# Patient Record
Sex: MALE | Race: WHITE | Employment: FULL TIME | URBAN - METROPOLITAN AREA
[De-identification: names, ages, dates, MRNs, and addresses within clinical notes are randomized per-mention and may not be internally consistent; named-entity substitution may affect disease eponyms.]

---

## 2021-04-14 ENCOUNTER — IMMUNIZATION (OUTPATIENT)
Dept: PRIMARY CARE CLINIC | Age: 66
End: 2021-04-14
Payer: COMMERCIAL

## 2021-04-14 PROCEDURE — 91300 COVID-19, PFIZER VACCINE 30MCG/0.3ML DOSE: CPT | Performed by: INTERNAL MEDICINE

## 2021-04-14 PROCEDURE — 0001A COVID-19, PFIZER VACCINE 30MCG/0.3ML DOSE: CPT | Performed by: INTERNAL MEDICINE

## 2021-05-10 ENCOUNTER — IMMUNIZATION (OUTPATIENT)
Dept: PRIMARY CARE CLINIC | Age: 66
End: 2021-05-10
Payer: COMMERCIAL

## 2021-05-10 PROCEDURE — 0002A COVID-19, PFIZER VACCINE 30MCG/0.3ML DOSE: CPT | Performed by: INTERNAL MEDICINE

## 2021-05-10 PROCEDURE — 91300 COVID-19, PFIZER VACCINE 30MCG/0.3ML DOSE: CPT | Performed by: INTERNAL MEDICINE

## 2022-02-21 PROBLEM — Z85.810 H/O TONGUE CANCER: Status: ACTIVE | Noted: 2021-04-14

## 2022-02-21 PROBLEM — E66.01 MORBID OBESITY (HCC): Status: ACTIVE | Noted: 2022-02-21

## 2022-02-21 PROBLEM — M10.9 GOUT: Status: ACTIVE | Noted: 2022-02-21

## 2022-02-21 PROBLEM — E78.5 HYPERLIPIDEMIA: Status: ACTIVE | Noted: 2022-02-21

## 2022-02-21 PROBLEM — I87.8 VENOUS STASIS: Status: ACTIVE | Noted: 2022-02-21

## 2022-02-21 PROBLEM — R60.9 PERIPHERAL EDEMA: Status: ACTIVE | Noted: 2022-02-21

## 2022-02-21 PROBLEM — G47.33 OBSTRUCTIVE SLEEP APNEA SYNDROME: Status: ACTIVE | Noted: 2022-02-21

## 2022-02-21 PROBLEM — I10 BENIGN ESSENTIAL HYPERTENSION: Status: ACTIVE | Noted: 2021-03-25

## 2022-02-21 PROBLEM — F41.1 GENERALIZED ANXIETY DISORDER: Status: ACTIVE | Noted: 2022-02-21

## 2022-02-21 PROBLEM — N52.9 ERECTILE DYSFUNCTION: Status: ACTIVE | Noted: 2022-02-21

## 2022-02-21 PROBLEM — J30.2 SEASONAL ALLERGIES: Status: ACTIVE | Noted: 2022-02-21

## 2022-02-21 PROBLEM — F32.1 MODERATE MAJOR DEPRESSION, SINGLE EPISODE (HCC): Status: ACTIVE | Noted: 2022-02-21

## 2022-02-21 PROBLEM — M1A.0710 CHRONIC IDIOPATHIC GOUT INVOLVING TOE OF RIGHT FOOT WITHOUT TOPHUS: Status: ACTIVE | Noted: 2022-02-21

## 2022-02-21 PROBLEM — E78.2 MIXED HYPERLIPIDEMIA: Status: ACTIVE | Noted: 2022-02-21

## 2025-02-26 ENCOUNTER — HOSPITAL ENCOUNTER (OUTPATIENT)
Dept: RADIOLOGY | Facility: HOSPITAL | Age: 70
Discharge: HOME | End: 2025-02-26
Payer: MEDICARE

## 2025-02-26 DIAGNOSIS — Z13.6 ENCOUNTER FOR SCREENING FOR CARDIOVASCULAR DISORDERS: ICD-10-CM

## 2025-02-26 PROCEDURE — 75571 CT HRT W/O DYE W/CA TEST: CPT

## 2025-03-25 ENCOUNTER — APPOINTMENT (OUTPATIENT)
Dept: RADIOLOGY | Facility: CLINIC | Age: 70
End: 2025-03-25
Payer: MEDICARE

## 2025-07-17 ENCOUNTER — HOSPITAL ENCOUNTER (INPATIENT)
Facility: HOSPITAL | Age: 70
LOS: 2 days | Discharge: HOME | End: 2025-07-19
Attending: STUDENT IN AN ORGANIZED HEALTH CARE EDUCATION/TRAINING PROGRAM | Admitting: STUDENT IN AN ORGANIZED HEALTH CARE EDUCATION/TRAINING PROGRAM
Payer: MEDICARE

## 2025-07-17 ENCOUNTER — ANESTHESIA EVENT (OUTPATIENT)
Dept: CARDIOLOGY | Facility: HOSPITAL | Age: 70
End: 2025-07-17
Payer: MEDICARE

## 2025-07-17 ENCOUNTER — APPOINTMENT (OUTPATIENT)
Dept: CARDIOLOGY | Facility: HOSPITAL | Age: 70
End: 2025-07-17
Payer: MEDICARE

## 2025-07-17 ENCOUNTER — ANESTHESIA (OUTPATIENT)
Dept: CARDIOLOGY | Facility: HOSPITAL | Age: 70
End: 2025-07-17
Payer: MEDICARE

## 2025-07-17 DIAGNOSIS — I47.20 VT (VENTRICULAR TACHYCARDIA) (MULTI): Primary | ICD-10-CM

## 2025-07-17 DIAGNOSIS — M10.10 LEAD-INDUCED ACUTE GOUT, UNSPECIFIED SITE, INITIAL ENCOUNTER: ICD-10-CM

## 2025-07-17 DIAGNOSIS — T56.0X1A LEAD-INDUCED ACUTE GOUT, UNSPECIFIED SITE, INITIAL ENCOUNTER: ICD-10-CM

## 2025-07-17 PROBLEM — M10.9 GOUT: Status: ACTIVE | Noted: 2025-07-17

## 2025-07-17 PROBLEM — E78.5 HYPERLIPIDEMIA: Status: ACTIVE | Noted: 2025-07-17

## 2025-07-17 PROBLEM — I25.10 CORONARY ARTERY DISEASE INVOLVING NATIVE CORONARY ARTERY WITHOUT ANGINA PECTORIS: Status: ACTIVE | Noted: 2025-07-17

## 2025-07-17 PROBLEM — R93.1 DECREASED CARDIAC EJECTION FRACTION: Status: ACTIVE | Noted: 2025-07-17

## 2025-07-17 PROBLEM — I63.9 CVA (CEREBRAL VASCULAR ACCIDENT) (MULTI): Status: ACTIVE | Noted: 2025-07-17

## 2025-07-17 PROBLEM — G45.9 TIA (TRANSIENT ISCHEMIC ATTACK): Status: ACTIVE | Noted: 2025-07-17

## 2025-07-17 LAB
ABO GROUP (TYPE) IN BLOOD: NORMAL
ACT BLD: 302 SEC (ref 82–174)
ACT BLD: 315 SEC (ref 82–174)
ALBUMIN SERPL BCP-MCNC: 3.8 G/DL (ref 3.4–5)
ALP SERPL-CCNC: 76 U/L (ref 33–136)
ALT SERPL W P-5'-P-CCNC: 12 U/L (ref 10–52)
ANION GAP SERPL CALC-SCNC: 14 MMOL/L (ref 10–20)
ANTIBODY SCREEN: NORMAL
APTT PPP: 31 SECONDS (ref 26–36)
AST SERPL W P-5'-P-CCNC: 16 U/L (ref 9–39)
ATRIAL RATE: 64 BPM
BASOPHILS # BLD AUTO: 0.03 X10*3/UL (ref 0–0.1)
BASOPHILS NFR BLD AUTO: 0.4 %
BILIRUB SERPL-MCNC: 0.8 MG/DL (ref 0–1.2)
BUN SERPL-MCNC: 12 MG/DL (ref 6–23)
CALCIUM SERPL-MCNC: 9.3 MG/DL (ref 8.6–10.6)
CHLORIDE SERPL-SCNC: 103 MMOL/L (ref 98–107)
CO2 SERPL-SCNC: 24 MMOL/L (ref 21–32)
CREAT SERPL-MCNC: 0.97 MG/DL (ref 0.5–1.3)
EGFRCR SERPLBLD CKD-EPI 2021: 84 ML/MIN/1.73M*2
EOSINOPHIL # BLD AUTO: 0.12 X10*3/UL (ref 0–0.7)
EOSINOPHIL NFR BLD AUTO: 1.5 %
ERYTHROCYTE [DISTWIDTH] IN BLOOD BY AUTOMATED COUNT: 13.7 % (ref 11.5–14.5)
GLUCOSE SERPL-MCNC: 89 MG/DL (ref 74–99)
HCT VFR BLD AUTO: 48 % (ref 41–52)
HGB BLD-MCNC: 15.1 G/DL (ref 13.5–17.5)
HOLD SPECIMEN: NORMAL
IMM GRANULOCYTES # BLD AUTO: 0.03 X10*3/UL (ref 0–0.7)
IMM GRANULOCYTES NFR BLD AUTO: 0.4 % (ref 0–0.9)
INR PPP: 1.2 (ref 0.9–1.1)
LYMPHOCYTES # BLD AUTO: 1.01 X10*3/UL (ref 1.2–4.8)
LYMPHOCYTES NFR BLD AUTO: 12.4 %
MAGNESIUM SERPL-MCNC: 2.21 MG/DL (ref 1.6–2.4)
MCH RBC QN AUTO: 28 PG (ref 26–34)
MCHC RBC AUTO-ENTMCNC: 31.5 G/DL (ref 32–36)
MCV RBC AUTO: 89 FL (ref 80–100)
MONOCYTES # BLD AUTO: 0.81 X10*3/UL (ref 0.1–1)
MONOCYTES NFR BLD AUTO: 9.9 %
NEUTROPHILS # BLD AUTO: 6.17 X10*3/UL (ref 1.2–7.7)
NEUTROPHILS NFR BLD AUTO: 75.4 %
NRBC BLD-RTO: 0 /100 WBCS (ref 0–0)
P AXIS: 82 DEGREES
P OFFSET: 203 MS
P ONSET: 145 MS
PLATELET # BLD AUTO: 158 X10*3/UL (ref 150–450)
POTASSIUM SERPL-SCNC: 4 MMOL/L (ref 3.5–5.3)
PR INTERVAL: 154 MS
PROT SERPL-MCNC: 6.4 G/DL (ref 6.4–8.2)
PROTHROMBIN TIME: 13 SECONDS (ref 9.8–12.4)
Q ONSET: 222 MS
QRS COUNT: 11 BEATS
QRS DURATION: 104 MS
QT INTERVAL: 414 MS
QTC CALCULATION(BAZETT): 427 MS
QTC FREDERICIA: 422 MS
R AXIS: -36 DEGREES
RBC # BLD AUTO: 5.39 X10*6/UL (ref 4.5–5.9)
RH FACTOR (ANTIGEN D): NORMAL
SODIUM SERPL-SCNC: 137 MMOL/L (ref 136–145)
T AXIS: 85 DEGREES
T OFFSET: 429 MS
T4 FREE SERPL-MCNC: 0.82 NG/DL (ref 0.78–1.48)
TSH SERPL-ACNC: 3.63 MIU/L (ref 0.44–3.98)
VENTRICULAR RATE: 64 BPM
WBC # BLD AUTO: 8.2 X10*3/UL (ref 4.4–11.3)

## 2025-07-17 PROCEDURE — 85347 COAGULATION TIME ACTIVATED: CPT

## 2025-07-17 PROCEDURE — 84443 ASSAY THYROID STIM HORMONE: CPT

## 2025-07-17 PROCEDURE — 85347 COAGULATION TIME ACTIVATED: CPT | Performed by: INTERNAL MEDICINE

## 2025-07-17 PROCEDURE — 93654 COMPRE EP EVAL TX VT: CPT | Performed by: INTERNAL MEDICINE

## 2025-07-17 PROCEDURE — 93662 INTRACARDIAC ECG (ICE): CPT | Performed by: INTERNAL MEDICINE

## 2025-07-17 PROCEDURE — 2780000003 HC OR 278 NO HCPCS: Performed by: INTERNAL MEDICINE

## 2025-07-17 PROCEDURE — 3700000002 HC GENERAL ANESTHESIA TIME - EACH INCREMENTAL 1 MINUTE: Performed by: INTERNAL MEDICINE

## 2025-07-17 PROCEDURE — 2020000001 HC ICU ROOM DAILY

## 2025-07-17 PROCEDURE — 85025 COMPLETE CBC W/AUTO DIFF WBC: CPT | Performed by: STUDENT IN AN ORGANIZED HEALTH CARE EDUCATION/TRAINING PROGRAM

## 2025-07-17 PROCEDURE — C1732 CATH, EP, DIAG/ABL, 3D/VECT: HCPCS | Performed by: INTERNAL MEDICINE

## 2025-07-17 PROCEDURE — G0269 OCCLUSIVE DEVICE IN VEIN ART: HCPCS | Performed by: INTERNAL MEDICINE

## 2025-07-17 PROCEDURE — 36415 COLL VENOUS BLD VENIPUNCTURE: CPT

## 2025-07-17 PROCEDURE — 2500000001 HC RX 250 WO HCPCS SELF ADMINISTERED DRUGS (ALT 637 FOR MEDICARE OP)

## 2025-07-17 PROCEDURE — C1759 CATH, INTRA ECHOCARDIOGRAPHY: HCPCS | Performed by: INTERNAL MEDICINE

## 2025-07-17 PROCEDURE — C1894 INTRO/SHEATH, NON-LASER: HCPCS | Performed by: INTERNAL MEDICINE

## 2025-07-17 PROCEDURE — 2500000004 HC RX 250 GENERAL PHARMACY W/ HCPCS (ALT 636 FOR OP/ED): Performed by: STUDENT IN AN ORGANIZED HEALTH CARE EDUCATION/TRAINING PROGRAM

## 2025-07-17 PROCEDURE — 99291 CRITICAL CARE FIRST HOUR: CPT

## 2025-07-17 PROCEDURE — 86850 RBC ANTIBODY SCREEN: CPT

## 2025-07-17 PROCEDURE — C1769 GUIDE WIRE: HCPCS | Performed by: INTERNAL MEDICINE

## 2025-07-17 PROCEDURE — C1887 CATHETER, GUIDING: HCPCS | Performed by: INTERNAL MEDICINE

## 2025-07-17 PROCEDURE — 02583ZZ DESTRUCTION OF CONDUCTION MECHANISM, PERCUTANEOUS APPROACH: ICD-10-PCS | Performed by: INTERNAL MEDICINE

## 2025-07-17 PROCEDURE — 84439 ASSAY OF FREE THYROXINE: CPT

## 2025-07-17 PROCEDURE — 80053 COMPREHEN METABOLIC PANEL: CPT | Performed by: STUDENT IN AN ORGANIZED HEALTH CARE EDUCATION/TRAINING PROGRAM

## 2025-07-17 PROCEDURE — 02K83ZZ MAP CONDUCTION MECHANISM, PERCUTANEOUS APPROACH: ICD-10-PCS | Performed by: INTERNAL MEDICINE

## 2025-07-17 PROCEDURE — 2500000004 HC RX 250 GENERAL PHARMACY W/ HCPCS (ALT 636 FOR OP/ED): Mod: JW | Performed by: ANESTHESIOLOGIST ASSISTANT

## 2025-07-17 PROCEDURE — 76937 US GUIDE VASCULAR ACCESS: CPT | Performed by: INTERNAL MEDICINE

## 2025-07-17 PROCEDURE — C1760 CLOSURE DEV, VASC: HCPCS | Performed by: INTERNAL MEDICINE

## 2025-07-17 PROCEDURE — 83735 ASSAY OF MAGNESIUM: CPT | Performed by: STUDENT IN AN ORGANIZED HEALTH CARE EDUCATION/TRAINING PROGRAM

## 2025-07-17 PROCEDURE — C1730 CATH, EP, 19 OR FEW ELECT: HCPCS | Performed by: INTERNAL MEDICINE

## 2025-07-17 PROCEDURE — C1766 INTRO/SHEATH,STRBLE,NON-PEEL: HCPCS | Performed by: INTERNAL MEDICINE

## 2025-07-17 PROCEDURE — 2500000004 HC RX 250 GENERAL PHARMACY W/ HCPCS (ALT 636 FOR OP/ED): Performed by: INTERNAL MEDICINE

## 2025-07-17 PROCEDURE — 85730 THROMBOPLASTIN TIME PARTIAL: CPT

## 2025-07-17 PROCEDURE — 93005 ELECTROCARDIOGRAM TRACING: CPT

## 2025-07-17 PROCEDURE — 3700000001 HC GENERAL ANESTHESIA TIME - INITIAL BASE CHARGE: Performed by: INTERNAL MEDICINE

## 2025-07-17 PROCEDURE — 93010 ELECTROCARDIOGRAM REPORT: CPT | Performed by: INTERNAL MEDICINE

## 2025-07-17 PROCEDURE — 2720000007 HC OR 272 NO HCPCS: Performed by: INTERNAL MEDICINE

## 2025-07-17 RX ORDER — COLCHICINE 0.6 MG/1
1.2 CAPSULE ORAL DAILY
Status: DISCONTINUED | OUTPATIENT
Start: 2025-07-17 | End: 2025-07-17

## 2025-07-17 RX ORDER — FENTANYL CITRATE 50 UG/ML
INJECTION, SOLUTION INTRAMUSCULAR; INTRAVENOUS AS NEEDED
Status: DISCONTINUED | OUTPATIENT
Start: 2025-07-17 | End: 2025-07-17

## 2025-07-17 RX ORDER — MEXILETINE HYDROCHLORIDE 200 MG/1
200 CAPSULE ORAL EVERY 12 HOURS SCHEDULED
Status: DISCONTINUED | OUTPATIENT
Start: 2025-07-17 | End: 2025-07-19 | Stop reason: HOSPADM

## 2025-07-17 RX ORDER — PROTAMINE SULFATE 10 MG/ML
INJECTION, SOLUTION INTRAVENOUS AS NEEDED
Status: DISCONTINUED | OUTPATIENT
Start: 2025-07-17 | End: 2025-07-17

## 2025-07-17 RX ORDER — NAPROXEN SODIUM 220 MG/1
81 TABLET, FILM COATED ORAL
COMMUNITY
Start: 2025-05-27 | End: 2026-05-27

## 2025-07-17 RX ORDER — HEPARIN SODIUM 1000 [USP'U]/ML
INJECTION, SOLUTION INTRAVENOUS; SUBCUTANEOUS AS NEEDED
Status: DISCONTINUED | OUTPATIENT
Start: 2025-07-17 | End: 2025-07-17 | Stop reason: HOSPADM

## 2025-07-17 RX ORDER — PHENYLEPHRINE 10 MG/250 ML(40 MCG/ML)IN 0.9 % SOD.CHLORIDE INTRAVENOUS
CONTINUOUS PRN
Status: DISCONTINUED | OUTPATIENT
Start: 2025-07-17 | End: 2025-07-17

## 2025-07-17 RX ORDER — PROPOFOL 10 MG/ML
INJECTION, EMULSION INTRAVENOUS CONTINUOUS PRN
Status: DISCONTINUED | OUTPATIENT
Start: 2025-07-17 | End: 2025-07-17

## 2025-07-17 RX ORDER — METOPROLOL TARTRATE 25 MG/1
25 TABLET, FILM COATED ORAL 2 TIMES DAILY
COMMUNITY
Start: 2025-07-11 | End: 2026-07-11

## 2025-07-17 RX ORDER — SODIUM CHLORIDE, SODIUM LACTATE, POTASSIUM CHLORIDE, CALCIUM CHLORIDE 600; 310; 30; 20 MG/100ML; MG/100ML; MG/100ML; MG/100ML
INJECTION, SOLUTION INTRAVENOUS CONTINUOUS PRN
Status: DISCONTINUED | OUTPATIENT
Start: 2025-07-17 | End: 2025-07-17

## 2025-07-17 RX ORDER — BUPIVACAINE HYDROCHLORIDE 5 MG/ML
INJECTION, SOLUTION EPIDURAL; INTRACAUDAL; PERINEURAL AS NEEDED
Status: DISCONTINUED | OUTPATIENT
Start: 2025-07-17 | End: 2025-07-17 | Stop reason: HOSPADM

## 2025-07-17 RX ORDER — LIDOCAINE HYDROCHLORIDE AND EPINEPHRINE BITARTRATE 20; .01 MG/ML; MG/ML
1.7 INJECTION, SOLUTION SUBCUTANEOUS ONCE
Status: DISCONTINUED | OUTPATIENT
Start: 2025-07-17 | End: 2025-07-17

## 2025-07-17 RX ORDER — ACETAMINOPHEN 500 MG
5 TABLET ORAL NIGHTLY PRN
COMMUNITY

## 2025-07-17 RX ORDER — MIDAZOLAM HYDROCHLORIDE 5 MG/ML
INJECTION INTRAMUSCULAR; INTRAVENOUS AS NEEDED
Status: DISCONTINUED | OUTPATIENT
Start: 2025-07-17 | End: 2025-07-17

## 2025-07-17 RX ORDER — DEXMEDETOMIDINE HYDROCHLORIDE 4 UG/ML
INJECTION, SOLUTION INTRAVENOUS CONTINUOUS PRN
Status: DISCONTINUED | OUTPATIENT
Start: 2025-07-17 | End: 2025-07-17

## 2025-07-17 RX ORDER — LIDOCAINE HYDROCHLORIDE AND EPINEPHRINE 10; 20 UG/ML; MG/ML
10 INJECTION, SOLUTION INFILTRATION; PERINEURAL ONCE
Status: DISCONTINUED | OUTPATIENT
Start: 2025-07-17 | End: 2025-07-19 | Stop reason: HOSPADM

## 2025-07-17 RX ORDER — NAPROXEN SODIUM 220 MG/1
81 TABLET, FILM COATED ORAL DAILY
Status: DISCONTINUED | OUTPATIENT
Start: 2025-07-17 | End: 2025-07-19 | Stop reason: HOSPADM

## 2025-07-17 RX ORDER — LIDOCAINE HYDROCHLORIDE ANHYDROUS AND DEXTROSE MONOHYDRATE .8; 5 G/100ML; G/100ML
INJECTION, SOLUTION INTRAVENOUS
Status: DISPENSED
Start: 2025-07-17 | End: 2025-07-17

## 2025-07-17 RX ORDER — METOPROLOL TARTRATE 25 MG/1
25 TABLET, FILM COATED ORAL 2 TIMES DAILY
Status: DISCONTINUED | OUTPATIENT
Start: 2025-07-17 | End: 2025-07-19 | Stop reason: HOSPADM

## 2025-07-17 RX ORDER — ATORVASTATIN CALCIUM 40 MG/1
40 TABLET, FILM COATED ORAL DAILY
COMMUNITY
Start: 2025-05-28

## 2025-07-17 RX ORDER — SODIUM CHLORIDE 9 MG/ML
INJECTION, SOLUTION INTRAVENOUS CONTINUOUS PRN
Status: COMPLETED | OUTPATIENT
Start: 2025-07-17 | End: 2025-07-17

## 2025-07-17 RX ORDER — ONDANSETRON HYDROCHLORIDE 2 MG/ML
INJECTION, SOLUTION INTRAVENOUS AS NEEDED
Status: DISCONTINUED | OUTPATIENT
Start: 2025-07-17 | End: 2025-07-17

## 2025-07-17 RX ORDER — POLYETHYLENE GLYCOL 3350 17 G/17G
17 POWDER, FOR SOLUTION ORAL DAILY
Status: DISCONTINUED | OUTPATIENT
Start: 2025-07-17 | End: 2025-07-19 | Stop reason: HOSPADM

## 2025-07-17 RX ORDER — COLCHICINE 0.6 MG/1
1.2 TABLET ORAL DAILY
Status: DISCONTINUED | OUTPATIENT
Start: 2025-07-17 | End: 2025-07-19 | Stop reason: HOSPADM

## 2025-07-17 RX ORDER — ATORVASTATIN CALCIUM 40 MG/1
40 TABLET, FILM COATED ORAL NIGHTLY
Status: DISCONTINUED | OUTPATIENT
Start: 2025-07-17 | End: 2025-07-19 | Stop reason: HOSPADM

## 2025-07-17 RX ORDER — AMMONIUM LACTATE 12 G/100G
1 LOTION TOPICAL 2 TIMES DAILY PRN
COMMUNITY
Start: 2024-11-14

## 2025-07-17 RX ADMIN — PROTAMINE SULFATE 40 MG: 10 INJECTION, SOLUTION INTRAVENOUS at 16:30

## 2025-07-17 RX ADMIN — APIXABAN 5 MG: 5 TABLET, FILM COATED ORAL at 20:34

## 2025-07-17 RX ADMIN — ATORVASTATIN CALCIUM 40 MG: 40 TABLET, FILM COATED ORAL at 20:34

## 2025-07-17 RX ADMIN — DEXMEDETOMIDINE HYDROCHLORIDE 0.4 MCG/KG/HR: 4 INJECTION, SOLUTION INTRAVENOUS at 13:54

## 2025-07-17 RX ADMIN — SODIUM CHLORIDE, POTASSIUM CHLORIDE, SODIUM LACTATE AND CALCIUM CHLORIDE: 600; 310; 30; 20 INJECTION, SOLUTION INTRAVENOUS at 13:54

## 2025-07-17 RX ADMIN — DEXAMETHASONE SODIUM PHOSPHATE 4 MG: 4 INJECTION INTRA-ARTICULAR; INTRALESIONAL; INTRAMUSCULAR; INTRAVENOUS; SOFT TISSUE at 13:54

## 2025-07-17 RX ADMIN — SODIUM CHLORIDE, SODIUM LACTATE, POTASSIUM CHLORIDE, AND CALCIUM CHLORIDE: 600; 310; 30; 20 INJECTION, SOLUTION INTRAVENOUS at 13:35

## 2025-07-17 RX ADMIN — METOPROLOL TARTRATE 25 MG: 25 TABLET, FILM COATED ORAL at 09:00

## 2025-07-17 RX ADMIN — FENTANYL CITRATE 25 MCG: 50 INJECTION, SOLUTION INTRAMUSCULAR; INTRAVENOUS at 15:12

## 2025-07-17 RX ADMIN — POLYETHYLENE GLYCOL 3350 17 G: 17 POWDER, FOR SOLUTION ORAL at 09:01

## 2025-07-17 RX ADMIN — ONDANSETRON 4 MG: 2 INJECTION, SOLUTION INTRAMUSCULAR; INTRAVENOUS at 13:54

## 2025-07-17 RX ADMIN — FENTANYL CITRATE 25 MCG: 50 INJECTION, SOLUTION INTRAMUSCULAR; INTRAVENOUS at 15:07

## 2025-07-17 RX ADMIN — FENTANYL CITRATE 50 MCG: 50 INJECTION, SOLUTION INTRAMUSCULAR; INTRAVENOUS at 13:54

## 2025-07-17 RX ADMIN — FENTANYL CITRATE 25 MCG: 50 INJECTION, SOLUTION INTRAMUSCULAR; INTRAVENOUS at 16:12

## 2025-07-17 RX ADMIN — MEXILETINE HYDROCHLORIDE 200 MG: 200 CAPSULE ORAL at 20:34

## 2025-07-17 RX ADMIN — PHENYLEPHRINE-NACL IV SOLUTION 10 MG/250ML-0.9% 0.2 MCG/KG/MIN: 10-0.9/25 SOLUTION at 15:06

## 2025-07-17 RX ADMIN — ASPIRIN 81 MG: 81 TABLET, CHEWABLE ORAL at 09:00

## 2025-07-17 RX ADMIN — PROPOFOL 5 MCG/KG/MIN: 10 INJECTION, EMULSION INTRAVENOUS at 13:54

## 2025-07-17 RX ADMIN — PROPOFOL 10 MG: 10 INJECTION, EMULSION INTRAVENOUS at 15:11

## 2025-07-17 RX ADMIN — COLCHICINE 1.2 MG: 0.6 TABLET, FILM COATED ORAL at 09:00

## 2025-07-17 RX ADMIN — MIDAZOLAM 2 MG: 5 INJECTION INTRAMUSCULAR; INTRAVENOUS at 13:35

## 2025-07-17 SDOH — ECONOMIC STABILITY: FOOD INSECURITY: WITHIN THE PAST 12 MONTHS, YOU WORRIED THAT YOUR FOOD WOULD RUN OUT BEFORE YOU GOT THE MONEY TO BUY MORE.: NEVER TRUE

## 2025-07-17 SDOH — ECONOMIC STABILITY: HOUSING INSECURITY: IN THE LAST 12 MONTHS, WAS THERE A TIME WHEN YOU WERE NOT ABLE TO PAY THE MORTGAGE OR RENT ON TIME?: NO

## 2025-07-17 SDOH — SOCIAL STABILITY: SOCIAL INSECURITY: DOES ANYONE TRY TO KEEP YOU FROM HAVING/CONTACTING OTHER FRIENDS OR DOING THINGS OUTSIDE YOUR HOME?: NO

## 2025-07-17 SDOH — HEALTH STABILITY: MENTAL HEALTH: HOW OFTEN DO YOU HAVE A DRINK CONTAINING ALCOHOL?: NEVER

## 2025-07-17 SDOH — ECONOMIC STABILITY: INCOME INSECURITY: IN THE PAST 12 MONTHS HAS THE ELECTRIC, GAS, OIL, OR WATER COMPANY THREATENED TO SHUT OFF SERVICES IN YOUR HOME?: NO

## 2025-07-17 SDOH — ECONOMIC STABILITY: FOOD INSECURITY: WITHIN THE PAST 12 MONTHS, THE FOOD YOU BOUGHT JUST DIDN'T LAST AND YOU DIDN'T HAVE MONEY TO GET MORE.: NEVER TRUE

## 2025-07-17 SDOH — SOCIAL STABILITY: SOCIAL INSECURITY: ARE YOU MARRIED, WIDOWED, DIVORCED, SEPARATED, NEVER MARRIED, OR LIVING WITH A PARTNER?: MARRIED

## 2025-07-17 SDOH — SOCIAL STABILITY: SOCIAL INSECURITY: WITHIN THE LAST YEAR, HAVE YOU BEEN HUMILIATED OR EMOTIONALLY ABUSED IN OTHER WAYS BY YOUR PARTNER OR EX-PARTNER?: NO

## 2025-07-17 SDOH — SOCIAL STABILITY: SOCIAL INSECURITY: ARE YOU OR HAVE YOU BEEN THREATENED OR ABUSED PHYSICALLY, EMOTIONALLY, OR SEXUALLY BY ANYONE?: NO

## 2025-07-17 SDOH — SOCIAL STABILITY: SOCIAL INSECURITY
WITHIN THE LAST YEAR, HAVE YOU BEEN RAPED OR FORCED TO HAVE ANY KIND OF SEXUAL ACTIVITY BY YOUR PARTNER OR EX-PARTNER?: NO

## 2025-07-17 SDOH — ECONOMIC STABILITY: HOUSING INSECURITY: AT ANY TIME IN THE PAST 12 MONTHS, WERE YOU HOMELESS OR LIVING IN A SHELTER (INCLUDING NOW)?: NO

## 2025-07-17 SDOH — ECONOMIC STABILITY: HOUSING INSECURITY: IN THE PAST 12 MONTHS, HOW MANY TIMES HAVE YOU MOVED WHERE YOU WERE LIVING?: 0

## 2025-07-17 SDOH — SOCIAL STABILITY: SOCIAL INSECURITY: WITHIN THE LAST YEAR, HAVE YOU BEEN AFRAID OF YOUR PARTNER OR EX-PARTNER?: NO

## 2025-07-17 SDOH — SOCIAL STABILITY: SOCIAL INSECURITY: ABUSE: ADULT

## 2025-07-17 SDOH — HEALTH STABILITY: MENTAL HEALTH: HOW MANY DRINKS CONTAINING ALCOHOL DO YOU HAVE ON A TYPICAL DAY WHEN YOU ARE DRINKING?: PATIENT DOES NOT DRINK

## 2025-07-17 SDOH — ECONOMIC STABILITY: TRANSPORTATION INSECURITY: IN THE PAST 12 MONTHS, HAS LACK OF TRANSPORTATION KEPT YOU FROM MEDICAL APPOINTMENTS OR FROM GETTING MEDICATIONS?: NO

## 2025-07-17 SDOH — HEALTH STABILITY: MENTAL HEALTH: HOW OFTEN DO YOU HAVE SIX OR MORE DRINKS ON ONE OCCASION?: NEVER

## 2025-07-17 SDOH — SOCIAL STABILITY: SOCIAL INSECURITY
WITHIN THE LAST YEAR, HAVE YOU BEEN KICKED, HIT, SLAPPED, OR OTHERWISE PHYSICALLY HURT BY YOUR PARTNER OR EX-PARTNER?: NO

## 2025-07-17 SDOH — SOCIAL STABILITY: SOCIAL INSECURITY: ARE THERE ANY APPARENT SIGNS OF INJURIES/BEHAVIORS THAT COULD BE RELATED TO ABUSE/NEGLECT?: NO

## 2025-07-17 SDOH — HEALTH STABILITY: PHYSICAL HEALTH: ON AVERAGE, HOW MANY MINUTES DO YOU ENGAGE IN EXERCISE AT THIS LEVEL?: 40 MIN

## 2025-07-17 SDOH — SOCIAL STABILITY: SOCIAL INSECURITY: WERE YOU ABLE TO COMPLETE ALL THE BEHAVIORAL HEALTH SCREENINGS?: YES

## 2025-07-17 SDOH — HEALTH STABILITY: PHYSICAL HEALTH: ON AVERAGE, HOW MANY DAYS PER WEEK DO YOU ENGAGE IN MODERATE TO STRENUOUS EXERCISE (LIKE A BRISK WALK)?: 3 DAYS

## 2025-07-17 SDOH — SOCIAL STABILITY: SOCIAL INSECURITY: HAVE YOU HAD THOUGHTS OF HARMING ANYONE ELSE?: NO

## 2025-07-17 SDOH — ECONOMIC STABILITY: FOOD INSECURITY: HOW HARD IS IT FOR YOU TO PAY FOR THE VERY BASICS LIKE FOOD, HOUSING, MEDICAL CARE, AND HEATING?: NOT VERY HARD

## 2025-07-17 SDOH — SOCIAL STABILITY: SOCIAL INSECURITY: DO YOU FEEL ANYONE HAS EXPLOITED OR TAKEN ADVANTAGE OF YOU FINANCIALLY OR OF YOUR PERSONAL PROPERTY?: NO

## 2025-07-17 SDOH — SOCIAL STABILITY: SOCIAL INSECURITY: HAS ANYONE EVER THREATENED TO HURT YOUR FAMILY OR YOUR PETS?: NO

## 2025-07-17 SDOH — SOCIAL STABILITY: SOCIAL INSECURITY: DO YOU FEEL UNSAFE GOING BACK TO THE PLACE WHERE YOU ARE LIVING?: NO

## 2025-07-17 ASSESSMENT — LIFESTYLE VARIABLES
SKIP TO QUESTIONS 9-10: 1
HOW MANY STANDARD DRINKS CONTAINING ALCOHOL DO YOU HAVE ON A TYPICAL DAY: PATIENT DOES NOT DRINK
AUDIT-C TOTAL SCORE: 0
HOW OFTEN DO YOU HAVE 6 OR MORE DRINKS ON ONE OCCASION: NEVER
HOW OFTEN DO YOU HAVE A DRINK CONTAINING ALCOHOL: NEVER
SKIP TO QUESTIONS 9-10: 1
AUDIT-C TOTAL SCORE: 0
AUDIT-C TOTAL SCORE: 0

## 2025-07-17 ASSESSMENT — ACTIVITIES OF DAILY LIVING (ADL)
HEARING - RIGHT EAR: HEARING AID
LACK_OF_TRANSPORTATION: NO
PATIENT'S MEMORY ADEQUATE TO SAFELY COMPLETE DAILY ACTIVITIES?: YES
LACK_OF_TRANSPORTATION: NO
DRESSING YOURSELF: INDEPENDENT
TOILETING: INDEPENDENT
GROOMING: INDEPENDENT
BATHING: INDEPENDENT
WALKS IN HOME: INDEPENDENT
ASSISTIVE_DEVICE: EYEGLASSES
FEEDING YOURSELF: INDEPENDENT
HEARING - LEFT EAR: HEARING AID
JUDGMENT_ADEQUATE_SAFELY_COMPLETE_DAILY_ACTIVITIES: YES
ADEQUATE_TO_COMPLETE_ADL: YES
LACK_OF_TRANSPORTATION: NO

## 2025-07-17 ASSESSMENT — COGNITIVE AND FUNCTIONAL STATUS - GENERAL
MOBILITY SCORE: 24
MOBILITY SCORE: 24
PATIENT BASELINE BEDBOUND: NO
DAILY ACTIVITIY SCORE: 24

## 2025-07-17 ASSESSMENT — PATIENT HEALTH QUESTIONNAIRE - PHQ9
1. LITTLE INTEREST OR PLEASURE IN DOING THINGS: NOT AT ALL
2. FEELING DOWN, DEPRESSED OR HOPELESS: NOT AT ALL
SUM OF ALL RESPONSES TO PHQ9 QUESTIONS 1 & 2: 0

## 2025-07-17 ASSESSMENT — PAIN SCALES - GENERAL
PAINLEVEL_OUTOF10: 0 - NO PAIN

## 2025-07-17 ASSESSMENT — PAIN - FUNCTIONAL ASSESSMENT
PAIN_FUNCTIONAL_ASSESSMENT: 0-10

## 2025-07-17 NOTE — ANESTHESIA PREPROCEDURE EVALUATION
Patient: Aditya Rueda Jr.    Procedure Information       Anesthesia Start Date/Time: 07/17/25 1316    Procedure: Ablation VT - Need anesthesia for the entire case    Location: [facility] STEREO / Virtual [facility] MAT 9352 Cardiac Cath Lab    Providers: Saroj Vragas MD            Relevant Problems   Cardiac   (+) Coronary artery disease involving native coronary artery without angina pectoris   (+) Decreased cardiac ejection fraction   (+) Hyperlipidemia       Clinical information reviewed:    Allergies  Meds               NPO Detail:  No data recorded     Physical Exam    Airway  Mallampati: II  TM distance: >3 FB  Neck ROM: full  Mouth opening: 3 or more finger widths     Cardiovascular   Rhythm: irregular     Dental    Pulmonary - normal exam   Abdominal - normal exam           Anesthesia Plan    History of general anesthesia?: yes  History of complications of general anesthesia?: no    ASA 3     MAC     intravenous induction   Anesthetic plan and risks discussed with patient.    Plan discussed with CAA.

## 2025-07-17 NOTE — ANESTHESIA POSTPROCEDURE EVALUATION
Patient: Aditya Rueda Jr.    Procedure Summary       Date: 07/17/25 Room / Location: [facility] STEREO / Virtual [facility] MAT 3529 Cardiac Cath Lab    Anesthesia Start: 1316 Anesthesia Stop: 1700    Procedure: Ablation VT Diagnosis: VT (ventricular tachycardia) (Multi)    Providers: Saroj Vargas MD Responsible Provider: Deangelo Nettles MD    Anesthesia Type: MAC ASA Status: 3            Anesthesia Type: MAC    Vitals Value Taken Time   BP 97/67 07/17/25 17:00   Temp 36.1 07/17/25 17:02   Pulse 59 07/17/25 17:00   Resp 21 07/17/25 17:00   SpO2 98 % 07/17/25 17:00   Vitals shown include unfiled device data.    Anesthesia Post Evaluation    Patient location during evaluation: ICU  Patient participation: complete - patient participated  Level of consciousness: awake and alert  Pain management: adequate  Airway patency: patent  Cardiovascular status: acceptable and hemodynamically stable  Respiratory status: acceptable, nonlabored ventilation, spontaneous ventilation and room air  Hydration status: acceptable  Postoperative Nausea and Vomiting: none        There were no known notable events for this encounter.

## 2025-07-17 NOTE — H&P
History Of Present Illness  Aditya Rueda is a 70 y.o. male with PMH of ASHWIN, HLD, gout, AAA, oral cancer who was transferred to [facility] for repeat VT ablation. Patient priorly admitted to [facility] for sustained VT s/p unsuccessful ablation.      OSH Course:  The patient was recently admitted following a near-syncope episode on July 8, during which a 23-second run of monomorphic ventricular tachycardia (VT) was documented. A CT coronary angiogram revealed nonobstructive coronary artery disease (CAD). Transthoracic echocardiography demonstrated low-normal left ventricular function. Cardiac MRI showed subendocardial scar in the basal septal wall, suggestive of a prior ischemic event, as well as areas of nonischemic scar.  The patient was started on metoprolol and flecainide, and evaluated by the electrophysiology team. He was discharged with an event monitor. He was subsequently notified of recurrent VT detected on the monitor and reported associated lightheadedness. He was advised to return to the emergency department.  On July 14, 2025, coronary angiography confirmed nonobstructive CAD, predominantly in the LAD. Lidocaine was discontinued to promote spontaneous ectopy for mapping during the EP study. S/P EP study w/ ablation however after recurrence of VT patient was transferred to  for repeat Ablation,    Upon interview:   Patient endorses as above. Reports that when he is having VT he can not subjectively feeling but reports occasional flutter. Denying any CP, flutter, PHAM at this time.    OSH labs:    Lab Results   Component Value Date      07/16/2025     K 3.6 07/16/2025      (H) 07/16/2025     CO2 23 07/16/2025     BUN 11 07/16/2025     CREATININE 0.89 07/16/2025     GLUCOSE 107 07/16/2025     CALCIUM 8.8 07/16/2025     MG 2.0 07/16/2025      CBC:         Lab Results   Component Value Date     WBC 6.9 07/16/2025     HGB 15.1 07/16/2025     HCT 45.4 07/16/2025     MCV 84.9 07/16/2025       "07/16/2025      Cardiac profile: No results found for: \"CKTOTAL\", \"CKMB\", \"TROPONINI\"  Coagulation:         Lab Results   Component Value Date     INR 1 03/16/2025        Other:         Lab Results   Component Value Date     HGBA1C 5.4 11/25/2024     TSH 5.75 (H) 07/13/2025       Cardiac Imaging:  TRANSTHORACIC ECHOCARDIOGRAM (TTE) COMPLETE (CONTRAST/BUBBLE/3D PRN) 06/17/2025  3:25 PM (Final)     Interpretation Summary    Left Ventricle: Not well visualized. Left ventricle size is normal. Normal wall thickness. Low normal left ventricular systolic function. EF by 2D Simpsons Biplane is 52%, techcnially difficult due to suboptimal image quality and beat to beat variability from ectopic beats. Global longitudinal strain is -17.7%. Normal wall motion. Indeterminate diastolic function. Normal left ventricular filling pressure.    Right Ventricle: Not well visualized. Right ventricle size is normal. Normal systolic function.    No significant valvular abnormalities.    Technically difficult study.     Signed by: Nolan Lemon MD on 6/17/2025  3:25 PM     Last Cath  07/13/25     CARDIAC PROCEDURE 07/14/2025  5:52 PM (Final)     Conclusion    LVEDP ~ 14 mmHg    No evidence of aortic stenosis    Mild nonobstructive coronary disease detailed below    Cardiac MRI 7/10:    CONCLUSIONS:    1. Mild biventricular enlargement with mildly reduced to low normal biventricular function.   2. Abnormal late gadolinium enhancement in the basal septum, most suspicious for a focal infarct given a   subendocardial pattern.  Nonischemic fibrosis of the basal septum is not fully excluded, especially given   chamber enlargement which would not be well explained by the small area of fibrosis.         Past Medical History  Medical History[1]    Surgical History  Surgical History[2]     Social History  He has no history on file for tobacco use, alcohol use, and drug use.    Family History  Family History[3]   "   Allergies  Allopurinol      Physical Exam    Constitutional: Well-developed male in no acute distress.  HEENT: Normocephalic, atraumatic. PERRL. EOMI. No cervical lymphadenopathy.  Respiratory: CTA bilaterally. No wheezes, rales, or rhonchi. Normal respiratory effort.  Cardiovascular: RRR. No murmurs, gallops, or rubs. No JVD. Radial pulses 2+.  Abdominal: Soft, nondistended, nontender to palpation. Bowel sounds present. No hepatosplenomegaly or masses. No CVA tenderness.  Neuro: CN II-XII intact. UE and LE strength 5/5 bilaterally and sensation intact. Normal FTN testing.  MSK: No LE edema bilaterally. Right femoral access site clean w/o evidence of hematoma  Skin: Warm, dry. No rashes or wounds.  Psych: Appropriate mood and affect.    Last Recorded Vitals  There were no vitals taken for this visit.    Relevant Results          Assessment & Plan  VT (ventricular tachycardia) (Multi)      Aditya Rueda is a 70 y.o. male with PMH of ASHWIN, HLD, gout, AAA, oral cancer who was transferred to [facility] for repeat VT ablation. Patient priorly admitted to [facility] for sustained VT s/p unsuccessful ablation, transferred to [facility] for EP study + VT ablation.      #Sustained monomorphic ventricular tachycardia  :: TSH normal  -History of TIA and multiple syncopal events leading to MCOT which discovered nonsustained ventricular tachycardia leading to ER presentation and admission on 7/9/2025. This workup included a coronary CTA which showed mild nonobstructive disease and a cardiac MRI that showed subendocardial late gadolinium enhancement in the basilar septum.    -VT originally suspected to be scar-mediated coming from the basal septum where he was noted to have LGE on his CMR.  - S/p VT ab;ation at OSH w/VT reccurence   Plan:  -tele   - npo for abaltion tomorrow  - cw metop 25 BID  - will not start suppressive medications 2/2  upcoming EP study       #Mild nonobstructive coronary artery disease  #HLD  :: 7/13/25 [facility]: LVEDP  14mmHg, no evidence of aortic stenosis, mild non-obstructing CAD  :: Lipid panel with an LDL of 50; nondiabetic A1c of 5.4 (3 months ago); non-smoker, no history of hypertension  -cw asa 81 mg daily, atorvastatin 40 mg daily, metoprolol tartrate 25 mg twice daily     #Mildly Dilated Abdominal Aortic Aneurysm   -CTA Abdomen pelvis in 3/2025 with 4.0 cm aneurysm  - OP FU     Hx Gout  ::States prior hx of gout treated with colchicine PRN  :: endorsing symtpoms in R knee and great toe  - cw colchicine 1.2mg every day follow symptom/adverse effect response    F: as needed  E: K>4 Mg>2  N: npo  A: PIV  DVT Ppx: none  Code status: full code  NOK:   Extended Emergency Contact Information  Primary Emergency Contact: RODRIGUEZ,KATHERINE  Mobile Phone: [phone]  Relation: Spouse  Preferred language: English   needed? No      Roman Jacobo MD         [1] No past medical history on file.  [2] No past surgical history on file.  [3] No family history on file.

## 2025-07-17 NOTE — PROGRESS NOTES
Communication Note    Patient Name: Aditya Rueda Jr.  MRN: [id number]  Today's Date: 7/17/2025   Room: 11/11-A    Discipline: Physical Therapy      PT Missed Visit: Yes  Missed Visit Reason:  (PT evaluation reviewed, patient pending ablation and furthe rmedical workup.  Will monitor and follow up as appropriate.)      07/17/25 at 8:57 AM   Dani Liang, PT   Rehab Office: [phone]

## 2025-07-17 NOTE — ANESTHESIA PROCEDURE NOTES
Peripheral IV  Date/Time: 7/17/2025 1:44 PM  Inserted by: ADAM Pappas    Placement  Needle size: 18 G  Laterality: left  Location: forearm  Local anesthetic: none  Site prep: chlorhexidine  Technique: anatomical landmarks  Attempts: 1

## 2025-07-17 NOTE — PROGRESS NOTES
"Pharmacy Medication History Review    Aditya Rueda Jr. is a 70 y.o. male admitted for VT (ventricular tachycardia) (Multi). Pharmacy reviewed the patient's [id number] medications and allergies for accuracy.    Medications ADDED:  All medications on PTA list  Medications CHANGED:  None  Medications REMOVED:   None     The list below reflects the updated PTA list.   Prior to Admission Medications   Prescriptions Last Dose Informant   ammonium lactate (Lac-Hydrin) 12 % lotion  Self   Sig: Apply 1 Application topically 2 times a day as needed.   aspirin 81 mg chewable tablet  Self   Sig: Chew and swallow 1 tablet (81 mg) once daily.   atorvastatin (Lipitor) 40 mg tablet  Self   Sig: Take 1 tablet (40 mg) by mouth once daily.   melatonin 5 mg tablet  Self   Sig: Take 1 tablet (5 mg) by mouth as needed at bedtime for sleep.   metoprolol tartrate (Lopressor) 25 mg tablet  Self   Sig: Take 1 tablet (25 mg) by mouth twice a day.      Facility-Administered Medications: None        The list below reflects the updated allergy list. Please review each documented allergy for additional clarification and justification.  Allergies  Reviewed by Corin Baxter PharmD on 7/17/2025        Severity Reactions Comments    Allopurinol Low Rash     Sulfa (sulfonamide Antibiotics) Low Rash             Patient was unable to be assessed for M2B at discharge. Patient unsure at this time- please reassess closer to discharge    Sources[facility]   [facility]  Pharmacy dispense history  Patient Interview --Moderate historian  Able to confirm/deny medications but required some prompting  Chart Review  Care Everywhere  [facility] Cardio note from 4/14  [facility] discharge summary form 7/10    Additional Comments:  None      Corin Baxter PharmD  Transitions of Care Pharmacist  07/17/25     Secure Chat preferred   If no response call [phone] or [email or web address] \"Med Rec\"      "

## 2025-07-17 NOTE — POST-PROCEDURE NOTE
Physician Transition of Care Summary  Invasive Cardiovascular Lab    Procedure Date: 7/17/2025  Attending:    * Saroj Vargas - Primary  Resident/Fellow/Other Assistant: Surgeons and Role:     * Jose Rain MD - Fellow    Indications:   Pre-op Diagnosis      * VT (ventricular tachycardia) (Multi) [I47.20]    Post-procedure diagnosis:   Post-op Diagnosis     * VT (ventricular tachycardia) (Multi) [I47.20]    Procedure(s):   Ablation VT  20561 - LA COMPRE EP EVAL ABLTJ 3D MAPG TX VT        Procedure Findings:   Acutely successful suppression of clinical PVC (inferior axis, LBBB transition at V4)    Description of the Procedure:   Right fem vein access x 2, left fem vein x 1  Mapped in CS no early signal  Mapped in RVOT and LVOT with some early pre-QRS 20 ms signals  Ablation performed on both sides (see images)    VT stims with non specific polymorphic VT and ventricular flutter but no sustained clinical VT    Closure with perclose bilaterally              Complications:   None immediate    Stents/Implants:   Implants       No implant documentation for this case.            Anticoagulation/Antiplatelet Plan:   Please start eliquis tonight, 5 mg BID x 30 days  Start mexiletine 200 BID  can continue beta blockers  Remain on telemetry overnight    Estimated Blood Loss:   15 mL    Anesthesia: Monitor Anesthesia Care Anesthesia Staff: Anesthesiologist: Deangelo Nettles MD; Zara Dejesus MD; Vanesa Mills MD  C-AA: ADAM Pappas; ADAM Koroma    Any Specimen(s) Removed:   No specimens collected during this procedure.    Disposition:   Return to CICU for monitoring       Electronically signed by: Jose Rain MD, 7/17/2025 4:47 PM

## 2025-07-17 NOTE — PROGRESS NOTES
"CARDIAC ICU PROGRESS NOTE    Aditya Rueda Jr./[id number]    Admit Date: 7/17/2025  Hospital Length of Stay: 0   ICU Length of Stay: 2h     Aditya Rueda is a 70 y.o. male with PMH of ASHWIN, HLD, gout, AAA, oral cancer who was transferred to [facility] for repeat VT ablation. Patient priorly admitted to [facility] for sustained VT s/p unsuccessful ablation.    Subjective   NAEON, P 60's, MAPs 67-88, ~95% RA    Upon evaluation this AM, no acute complaints or symptoms. He was noted to have sustained Vtach around 9am which resolved on its own. Patient did not complain of chest pain or SOB.          Objective    VITALS:   Visit Vitals  BP 96/54   Pulse 57   Temp 36.3 °C (97.3 °F) (Temporal)   Resp 19   Ht 1.778 m (5' 10\")   Wt 96.7 kg (213 lb 3 oz)   SpO2 95%   BMI 30.59 kg/m²   Smoking Status Former   BSA 2.19 m²         Infusions:       INTAKE/OUTPUT:  No intake/output data recorded.     Wt Readings from Last 5 Encounters:   07/17/25 96.7 kg (213 lb 3 oz)   07/16/25 99.3 kg (219 lb)       LABS:  CBC:  Recent Labs     07/17/25  0512   WBC 8.2   HGB 15.1   HCT 48.0      MCV 89     CMP:  Recent Labs     07/17/25  0512      K 4.0      CO2 24   ANIONGAP 14   BUN 12   CREATININE 0.97   EGFR 84   MG 2.21     Recent Labs     07/17/25  0512   ALBUMIN 3.8   ALT 12   AST 16   BILITOT 0.8     COAG: No results for input(s): \"PTT\", \"INR\", \"ARIXTRA\" in the last 59226 hours.  ABO: No results for input(s): \"ABO\" in the last 12109 hours.  HEME/ENDO:  Recent Labs     03/16/25  1132 02/25/22  1232   HGBA1C 5 5.4      CARDIAC: No results for input(s): \"LDH\", \"CKMB\", \"TROPHS\", \"BNP\" in the last 57505 hours.    No lab exists for component: \"CK\", \"CKMBP\"  No results for input(s): \"LACMX\", \"LACTATEART\", \"SO2MV\", \"O2CMX\" in the last 95115 hours.    No lab exists for component: \"S\"  No results for input(s): \"TACROLIMUS\", \"SIROLIMUS\", \"CYCLOSPORINE\" in the last 06786 hours.    Results from last 7 days   Lab Units 07/17/25  0512   WBC AUTO " "x10*3/uL 8.2   HEMOGLOBIN g/dL 15.1   HEMATOCRIT % 48.0   PLATELETS AUTO x10*3/uL 158     Results from last 7 days   Lab Units 07/17/25  0512   SODIUM mmol/L 137   POTASSIUM mmol/L 4.0   CO2 mmol/L 24   ANION GAP mmol/L 14   BUN mg/dL 12   CREATININE mg/dL 0.97   GLUCOSE mg/dL 89   EGFR mL/min/1.73m*2 84   MAGNESIUM mg/dL 2.21      Results from last 7 days   Lab Units 07/17/25  0512   ALT U/L 12   AST U/L 16   ALK PHOS U/L 76            No lab exists for component: \"PT\"                No lab exists for component: \"BNPRESU\", \"CPKT\"            No results found for: \"CKTOTAL\", \"CKMB\", \"CKMBINDEX\", \"TROPONINI\"   Lab Results   Component Value Date    HGBA1C 5 03/16/2025      No results found for: \"CHOL\"  No results found for: \"HDL\"  No results found for: \"LDLCALC\"  No results found for: \"TRIG\"  No components found for: \"CHOLHDL\"     IMAGING:   Imaging  No results found.    Cardiology, Vascular, and Other Imaging  No other imaging results found for the past 2 days       PHYSICAL EXAM:  Constitutional: Well-developed male in no acute distress.  HEENT: Normocephalic, atraumatic. PERRL. EOMI. No cervical lymphadenopathy.  Respiratory: CTA bilaterally. No wheezes, rales, or rhonchi. Normal respiratory effort.  Cardiovascular: RRR. No murmurs, gallops, or rubs. No JVD. Radial pulses 2+.  Abdominal: Soft, nondistended, nontender to palpation. Bowel sounds present. No hepatosplenomegaly or masses. No CVA tenderness.  Neuro: CN II-XII intact. UE and LE strength 5/5 bilaterally and sensation intact. Normal FTN testing.  MSK: No LE edema bilaterally. Right femoral access site clean w/o evidence of hematoma, Swelling on R-Knee, R-Hallux  Skin: Warm, dry. No rashes or wounds.  Psych: Appropriate mood and affect.    MEDICATIONS:   Scheduled medications  Scheduled Medications[1]    Continuous medications  Continuous Medications[2]    PRN medications  PRN Medications[3]          Assessment/Plan   Aditya FISCHER Marybeth is a 70 y.o. male with PMH " of ASHWIN, HLD, gout, AAA, oral cancer who was transferred to [facility] for repeat VT ablation. Patient priorly admitted to [facility] for sustained VT s/p unsuccessful ablation, transferred to [facility] for EP study + VT ablation.     Summary for 07/17/25:  -1 episode of Sustained VT this morning, asymptomatic ,VSS  -Plan for Ablation this afternoon  -Repeat TSH, T4 given elevated 5.75 from [facility]  -7/10 Cardiac MRI Images requested from [facility] STAT    Neuro:  JACOBO    Cardio:  #Sustained monomorphic ventricular tachycardia  :: TSH 5.75 (7/13)  -History of TIA and multiple syncopal events leading to MCOT which discovered nonsustained ventricular tachycardia leading to ER presentation and admission on 7/9/2025. This workup included a coronary CTA which showed mild nonobstructive disease and a cardiac MRI that showed subendocardial late gadolinium enhancement in the basilar septum.    -VT originally suspected to be scar-mediated coming from the basal septum where he was noted to have LGE on his CMR.  - S/p VT ab;ation at OSH w/VT reccurence   Plan:  -tele   -Ablation today w/ EP at Noon  - cw metop 25 BID  - will not start suppressive medications 2/2  upcoming EP study  - Repeat TSH / T4     #Mild nonobstructive coronary artery disease  #HLD  :: 7/13/25 [facility]: LVEDP 14mmHg, no evidence of aortic stenosis, mild non-obstructing CAD  :: Lipid panel with an LDL of 50; nondiabetic A1c of 5.4 (3 months ago); non-smoker, no history of hypertension  -cw asa 81 mg daily, atorvastatin 40 mg daily, metoprolol tartrate 25 mg twice daily    #Mildly Dilated Abdominal Aortic Aneurysm   -CTA Abdomen pelvis in 3/2025 with 4.0 cm aneurysm  - OP FU    Pulmonary:  JACOBO    Renal:  JACOBO    Gastrointestinal:  JACOBO    Heme/Onc:  JACOBO    Endocrine:  JACOBO    MSK:  Hx Gout  ::States prior hx of gout treated with colchicine PRN  :: Charted allergy to Allopurinol  :: endorsing symtpoms in R knee and great toe  - cw colchicine 1.2mg every day follow symptom/adverse  effect response    F: as needed  E: K>4 Mg>2  N: npo  A: PIV  DVT Ppx: none  Code status: full code    Code Status: Full Code (confirmed on admission)   NOK: Extended Emergency Contact Information  Primary Emergency Contact: KATHERINE RFIAS  Mobile Phone: [phone]  Relation: Spouse  Preferred language: English   needed? No        Staffed with attending physician during rounds    Trevon Miller MD PhD  PGY-2 Internal Medicine          [1] aspirin, 81 mg, oral, Daily  atorvastatin, 40 mg, oral, Nightly  colchicine, 1.2 mg, oral, Daily  metoprolol tartrate, 25 mg, oral, BID  polyethylene glycol, 17 g, oral, Daily  [2]    [3]

## 2025-07-17 NOTE — DISCHARGE INSTRUCTIONS
INSTRUCTIONS AFTER ABLATION PROCEDURE:    * In the first week after ablation you should take it easy. No heavy lifting or heavy exertion, no treadmill.  You can use the stairs if needed but go slowly and minimize the number of times up and down.    * Some minor bruising is common at each groin access site with minor soreness as if you had banged the area. Bruising may occasionally be seen to extend down the leg. This is normal as is an occasional small quarter sized bump in the area. If larger swelling or more significant pain occurs at the area, please contact the office or go the nearest Emergency Room.    *Continue to follow up with your primary cardiologist, primary care physician, and any other specialists you normally see.    * NO DRIVING until you see Dr Koby Bonilla for follow up appointment.     *Diet: Heart healthy    Call Provider If:  Breathing faster than normal.     Fever of 100.4 F (38 C) or higher.     Chills.     Any new concerning symptoms.     Passing out.     Patient Instructions, Next 24 hours:  DO NOT drive a car, operate machinery or power tools.  It is recommended that a responsible adult be with you for the first 24 hours.     DO NOT drink any alcoholic drinks or take any non-prescriptive medications that contain alcohol for the first 24 hours.     DO NOT make any important decisions for the first 24 hours.    Activity:  You are advised to go directly home from the hospital.     DO NOT lift anything heavier than 10 pounds for one week, this allows for proper healing of the groin.     No excessive exercise or treadmill use for one week. You may walk and do stairs, slowly.     No sexual activities for 24 hours after you arrive home.    Wound Care:  If slight bleeding should occur at site, lie down and have someone apply firm pressure just above the puncture site for 5 minutes.  If it continues or is profuse, call 911. Always notify your doctor if bleeding occurs.     Keep site clean and dry. Let  air dry or you may use a simple bandaid.     Gently cleanse the puncture site in your groin with soap and water only.     You may experience some tenderness, bruising or minimal inflammation.  If you have any concerns, you may contact the Cath Lab or if any of these symptoms become excessive, contact your cardiologist or go to the emergency room.     No tub baths, soaking, or swimming for one week.     May shower the next day after your procedure.    If you have any questions about the effects of the sedative drugs or groin care, please call the physician who performed your procedure.    FOLLOW UP:   1) Dr Koby Bonilla ([facility]) 1 month after ablation-->Dr Bonilla's office will contact you with appointment. If you don't hear back in 1 week, please call his office.  2) Please follow up with your PCP on 7/29/2025 at 12:20 PM at [facility]  3) Please continue mexilitene 200 mg BID  4) Please continue wearing LifeVest      It was a pleasure taking care of you,  Your  Care Team

## 2025-07-17 NOTE — ANESTHESIA PROCEDURE NOTES
Arterial Line:    Date/Time: 7/17/2025 1:39 PM    Staffing  Performed: [facility]   Authorized by: Vanesa Mills MD    Performed by: ADAM Pappas    An arterial line was placed. Procedure performed using surface landmarks.in the OR for the following indication(s): continuous blood pressure monitoring and blood sampling needed.    A 20 gauge (size), 12 cm (length), Arrow (type) catheter was placed into the Left radial artery, secured by Tegaderm,   Seldinger technique used.  Events:  patient tolerated procedure well with no complications.

## 2025-07-17 NOTE — PROGRESS NOTES
Pharmacy Admission Order Reconciliation Review    Aditya Rueda Jr. is a 70 y.o. male admitted for VT (ventricular tachycardia) (Multi). Pharmacy reviewed the patient's unreconciled admission medications.    Prior to admission medications that were reviewed and acted on by the pharmacist include:  Aspirin  Atorvastatin   Melatonin  Metoprolol tartrate  Lac-Hydrin lotion   These medications have been reconciled.     Any other unreconcilied medications have been addressed and will be ordered or held by the patient's medical team. Medications addressed by the pharmacist may be added or changed by the patient's medical team at any time.    Sj Ramsey, Sandra  [facility]  [address]  [facility], Room# [address]  [place] [address]  Phone: [phone]  Please reach out via Secure Chat for questions, or if no response call [employer] or [facility]

## 2025-07-17 NOTE — PROGRESS NOTES
07/17/25 0946   Discharge Planning   Living Arrangements Spouse/significant other   Support Systems Spouse/significant other;Children   Assistance Needed Patient was independent with ADLs prior to admission.   Type of Residence Private residence   Who is requesting discharge planning? Provider   Home or Post Acute Services   (TBD)   Does the patient need discharge transport arranged? No   Financial Resource Strain   How hard is it for you to pay for the very basics like food, housing, medical care, and heating? Not very   Housing Stability   In the last 12 months, was there a time when you were not able to pay the mortgage or rent on time? N   In the past 12 months, how many times have you moved where you were living? 0   At any time in the past 12 months, were you homeless or living in a shelter (including now)? N   Transportation Needs   In the past 12 months, has lack of transportation kept you from medical appointments or from getting medications? no   In the past 12 months, has lack of transportation kept you from meetings, work, or from getting things needed for daily living? No     - ICU TREATMENT PLAN: [facility].   - Payer: [employer]  -Support System: Spouse, children  - Planned Disposition: Pending medical outcome and rehab recommendations.  - Additional Information: SDOH and Social Work Discharge Planning assessments were completed with the patient.   -ADLs: Patient was independent with ADLs prior to admission.  -Home Care: denies  -DME: denies  -HD: denies  - Barriers to discharge: None at this time. SW will continue to follow.

## 2025-07-18 LAB
ALBUMIN SERPL BCP-MCNC: 3.4 G/DL (ref 3.4–5)
ANION GAP SERPL CALC-SCNC: 15 MMOL/L (ref 10–20)
BASOPHILS # BLD AUTO: 0.02 X10*3/UL (ref 0–0.1)
BASOPHILS NFR BLD AUTO: 0.2 %
BUN SERPL-MCNC: 19 MG/DL (ref 6–23)
CALCIUM SERPL-MCNC: 8.8 MG/DL (ref 8.6–10.6)
CHLORIDE SERPL-SCNC: 104 MMOL/L (ref 98–107)
CO2 SERPL-SCNC: 25 MMOL/L (ref 21–32)
CREAT SERPL-MCNC: 0.95 MG/DL (ref 0.5–1.3)
EGFRCR SERPLBLD CKD-EPI 2021: 86 ML/MIN/1.73M*2
EOSINOPHIL # BLD AUTO: 0.02 X10*3/UL (ref 0–0.7)
EOSINOPHIL NFR BLD AUTO: 0.2 %
ERYTHROCYTE [DISTWIDTH] IN BLOOD BY AUTOMATED COUNT: 12.7 % (ref 11.5–14.5)
GLUCOSE SERPL-MCNC: 126 MG/DL (ref 74–99)
HCT VFR BLD AUTO: 43.5 % (ref 41–52)
HGB BLD-MCNC: 14.6 G/DL (ref 13.5–17.5)
IMM GRANULOCYTES # BLD AUTO: 0.04 X10*3/UL (ref 0–0.7)
IMM GRANULOCYTES NFR BLD AUTO: 0.4 % (ref 0–0.9)
LYMPHOCYTES # BLD AUTO: 0.59 X10*3/UL (ref 1.2–4.8)
LYMPHOCYTES NFR BLD AUTO: 5.3 %
MAGNESIUM SERPL-MCNC: 2.05 MG/DL (ref 1.6–2.4)
MCH RBC QN AUTO: 28.5 PG (ref 26–34)
MCHC RBC AUTO-ENTMCNC: 33.6 G/DL (ref 32–36)
MCV RBC AUTO: 85 FL (ref 80–100)
MONOCYTES # BLD AUTO: 0.88 X10*3/UL (ref 0.1–1)
MONOCYTES NFR BLD AUTO: 8 %
NEUTROPHILS # BLD AUTO: 9.49 X10*3/UL (ref 1.2–7.7)
NEUTROPHILS NFR BLD AUTO: 85.9 %
NRBC BLD-RTO: 0 /100 WBCS (ref 0–0)
PHOSPHATE SERPL-MCNC: 2.8 MG/DL (ref 2.5–4.9)
PLATELET # BLD AUTO: 162 X10*3/UL (ref 150–450)
POTASSIUM SERPL-SCNC: 3.9 MMOL/L (ref 3.5–5.3)
RBC # BLD AUTO: 5.13 X10*6/UL (ref 4.5–5.9)
SODIUM SERPL-SCNC: 140 MMOL/L (ref 136–145)
WBC # BLD AUTO: 11 X10*3/UL (ref 4.4–11.3)

## 2025-07-18 PROCEDURE — 97530 THERAPEUTIC ACTIVITIES: CPT | Mod: GP

## 2025-07-18 PROCEDURE — 1100000001 HC PRIVATE ROOM DAILY

## 2025-07-18 PROCEDURE — 37799 UNLISTED PX VASCULAR SURGERY: CPT | Performed by: STUDENT IN AN ORGANIZED HEALTH CARE EDUCATION/TRAINING PROGRAM

## 2025-07-18 PROCEDURE — 83735 ASSAY OF MAGNESIUM: CPT

## 2025-07-18 PROCEDURE — 2500000001 HC RX 250 WO HCPCS SELF ADMINISTERED DRUGS (ALT 637 FOR MEDICARE OP)

## 2025-07-18 PROCEDURE — 2500000002 HC RX 250 W HCPCS SELF ADMINISTERED DRUGS (ALT 637 FOR MEDICARE OP, ALT 636 FOR OP/ED)

## 2025-07-18 PROCEDURE — 97161 PT EVAL LOW COMPLEX 20 MIN: CPT | Mod: GP

## 2025-07-18 PROCEDURE — 97530 THERAPEUTIC ACTIVITIES: CPT | Mod: GO

## 2025-07-18 PROCEDURE — 80069 RENAL FUNCTION PANEL: CPT

## 2025-07-18 PROCEDURE — 97165 OT EVAL LOW COMPLEX 30 MIN: CPT | Mod: GO

## 2025-07-18 PROCEDURE — 85025 COMPLETE CBC W/AUTO DIFF WBC: CPT | Performed by: STUDENT IN AN ORGANIZED HEALTH CARE EDUCATION/TRAINING PROGRAM

## 2025-07-18 RX ORDER — COLCHICINE 0.6 MG/1
1.2 TABLET ORAL DAILY
Qty: 60 TABLET | Refills: 0 | Status: CANCELLED | OUTPATIENT
Start: 2025-07-19 | End: 2025-08-18

## 2025-07-18 RX ORDER — DOCUSATE SODIUM 100 MG/1
100 CAPSULE, LIQUID FILLED ORAL 2 TIMES DAILY
Status: DISCONTINUED | OUTPATIENT
Start: 2025-07-18 | End: 2025-07-19 | Stop reason: HOSPADM

## 2025-07-18 RX ORDER — POTASSIUM CHLORIDE 20 MEQ/1
20 TABLET, EXTENDED RELEASE ORAL ONCE
Status: COMPLETED | OUTPATIENT
Start: 2025-07-18 | End: 2025-07-18

## 2025-07-18 RX ORDER — MEXILETINE HYDROCHLORIDE 200 MG/1
200 CAPSULE ORAL EVERY 12 HOURS SCHEDULED
Qty: 60 CAPSULE | Refills: 0 | Status: CANCELLED | OUTPATIENT
Start: 2025-07-18 | End: 2025-08-17

## 2025-07-18 RX ADMIN — MEXILETINE HYDROCHLORIDE 200 MG: 200 CAPSULE ORAL at 08:12

## 2025-07-18 RX ADMIN — APIXABAN 5 MG: 5 TABLET, FILM COATED ORAL at 20:35

## 2025-07-18 RX ADMIN — METOPROLOL TARTRATE 25 MG: 25 TABLET, FILM COATED ORAL at 20:35

## 2025-07-18 RX ADMIN — METOPROLOL TARTRATE 25 MG: 25 TABLET, FILM COATED ORAL at 08:12

## 2025-07-18 RX ADMIN — ATORVASTATIN CALCIUM 40 MG: 40 TABLET, FILM COATED ORAL at 20:35

## 2025-07-18 RX ADMIN — POTASSIUM CHLORIDE 20 MEQ: 1500 TABLET, EXTENDED RELEASE ORAL at 08:12

## 2025-07-18 RX ADMIN — APIXABAN 5 MG: 5 TABLET, FILM COATED ORAL at 08:12

## 2025-07-18 RX ADMIN — ASPIRIN 81 MG: 81 TABLET, CHEWABLE ORAL at 08:12

## 2025-07-18 RX ADMIN — COLCHICINE 1.2 MG: 0.6 TABLET, FILM COATED ORAL at 08:12

## 2025-07-18 ASSESSMENT — COGNITIVE AND FUNCTIONAL STATUS - GENERAL
WALKING IN HOSPITAL ROOM: A LITTLE
DRESSING REGULAR LOWER BODY CLOTHING: A LITTLE
TOILETING: A LITTLE
MOBILITY SCORE: 24
CLIMB 3 TO 5 STEPS WITH RAILING: A LITTLE
TURNING FROM BACK TO SIDE WHILE IN FLAT BAD: A LITTLE
MOVING TO AND FROM BED TO CHAIR: A LITTLE
DAILY ACTIVITIY SCORE: 24
HELP NEEDED FOR BATHING: A LITTLE
MOVING FROM LYING ON BACK TO SITTING ON SIDE OF FLAT BED WITH BEDRAILS: A LITTLE
STANDING UP FROM CHAIR USING ARMS: A LITTLE
MOBILITY SCORE: 18
DAILY ACTIVITIY SCORE: 21

## 2025-07-18 ASSESSMENT — PAIN - FUNCTIONAL ASSESSMENT
PAIN_FUNCTIONAL_ASSESSMENT: 0-10

## 2025-07-18 ASSESSMENT — PAIN SCALES - GENERAL
PAINLEVEL_OUTOF10: 0 - NO PAIN

## 2025-07-18 ASSESSMENT — ACTIVITIES OF DAILY LIVING (ADL)
ADL_ASSISTANCE: INDEPENDENT
BATHING_ASSISTANCE: MINIMAL
ADL_ASSISTANCE: INDEPENDENT

## 2025-07-18 NOTE — SIGNIFICANT EVENT
ICU to [facility] Transfer Summary     I:  ICU Admission Reason & Brief ICU Course:    Aditya Rueda is a 70 y.o. male with PMH of ASHWIN, HLD, gout, AAA, oral cancer who was transferred to [facility] for repeat VT ablation. Patient priorly admitted to [facility] for sustained VT s/p unsuccessful ablation, transferred to [facility] for EP study + VT ablation. Patient underwent ablation here at [facility] with Dr. Vargas on 7/17. Had some residual rarae clinical PVCs but symptom of lightheadedness and palpitations have improved. Patient was started on mexiletine 200 mg BID as well as eliquis 5 mg BID for 30 days. A life vest order was placed. He remains HDS and ready for transfer to the floor with telemetry.    To Do  [ ] make sure life vest is placed  [ ] PT/OT  [x] Mexiletine 200 mg BID(started)  [x] Eliquis 5 mg BID for 30 days (started)        C: Code Status/DPOA Info/Goals of Care/ACP Note    Full Code  DPOA/Contact Number: Meghan Rueda (Spouse)  [phone]     U: Unprescribing & Pertinent High-Risk Medications    Changes to home meds:   [x] Mexiletine 200 mg BID(started)  [x] Eliquis 5 mg BID for 30 days (started)       Anticoagulation: Yes - Therapeutic Apixaban    Antibiotics:   [x] N/A - no current planned antimicrobioals    P: Pending Tests at the Time of Transfer   N/a      A: Active consultants, including Rehab:   []  Subspecialty Consultants: cardiology EP  [x]  PT  [x]  OT  []  SLP  []  Wound Care    U: Uncertainty Measure/Diagnostic Pause:    Working diagnosis at the time of transfer VT,     Diagnosis Degree of Certainty: 1. High degree of certainty about the clinical diagnosis.     S: Summary of Major Problems and To-Dos:   #Sustained monomorphic ventricular tachycardia  :: TSH 5.75 (7/13)  -History of TIA and multiple syncopal events leading to [facility] which discovered nonsustained ventricular tachycardia leading to ER presentation and admission on 7/9/2025. This workup included a coronary CTA which showed mild nonobstructive  disease and a cardiac MRI that showed subendocardial late gadolinium enhancement in the basilar septum.    -VT originally suspected to be scar-mediated coming from the basal septum where he was noted to have LGE on his CMR.  - S/p VT ab;ation at OSH w/VT reccurence   -Ablation w/ EP on 7/18   - cw metop 25 BID  - will not start suppressive medications 2/2  upcoming EP study  - Had some PVCs post procedure --> will discharge with life vest and close follow up with Dr. Bonilla at [facility]  - Started mexiletine 200mg BID, eliquis 5 BID     #Mild nonobstructive coronary artery disease  #HLD  :: 7/13/25 LHC: LVEDP 14mmHg, no evidence of aortic stenosis, mild non-obstructing CAD  :: Lipid panel with an LDL of 50; nondiabetic A1c of 5.4 (3 months ago); non-smoker, no history of hypertension  -cw asa 81 mg daily, atorvastatin 40 mg daily, metoprolol tartrate 25 mg twice daily     #Mildly Dilated Abdominal Aortic Aneurysm   -CTA Abdomen pelvis in 3/2025 with 4.0 cm aneurysm  - OP FU    To-do list prior to transfer:  [ ] make sure life vest is placed  [ ] PT/OT  [x] Mexiletine 200 mg BID(started)  [x] Eliquis 5 mg BID for 30 days (started)       E: Exam, including Lines/Drains/Airways & Data Review:   GEN: NAD  HEENT: ATNC,  anicteric, no JVD  CV: RRR  Pulm: CTAB  Abdomen: NTND  Ext: warm, bilateral groin access sites with no hematoma  Neuro: A+Ox3  Psych: appropriate  Difficult airway? No  Lines/drains assessed for removal? Yes, describe: A line    Within 30 minutes of the patient physically leaving the floor, a Floor Readiness Note needs to be placed with updated vitals.

## 2025-07-18 NOTE — PROGRESS NOTES
Physical Therapy    Physical Therapy Evaluation & Treatment    Patient Name: Aditya Rueda Jr.  MRN: [id number]  Department: [facility]  Room: 7013/7013-A  Today's Date: 7/18/2025   Time Calculation  Start Time: 1052  Stop Time: 1116  Time Calculation (min): 24 min    Assessment/Plan   PT Assessment  PT Assessment Results: Decreased strength, Decreased endurance, Impaired balance, Decreased mobility  Rehab Prognosis: Excellent  Barriers to Discharge Home: No anticipated barriers  Evaluation/Treatment Tolerance: Patient tolerated treatment well  End of Session Communication: Bedside nurse  Assessment Comment: Patient presenting s/p 07/17 VT ablation. He demonstrates decreased strength, impaired endurance, and decreased balance. He would benefit from low intensity therapy to restore functional independence.  End of Session Patient Position: Up in chair, Alarm off, not on at start of session   IP OR SWING BED PT PLAN  Inpatient or Swing Bed: Inpatient  PT Plan  Treatment/Interventions: Bed mobility, Transfer training, Gait training, Stair training, Balance training, Neuromuscular re-education, Strengthening, Range of motion, Endurance training, Therapeutic exercise, Therapeutic activity, Home exercise program, Positioning, Postural re-education  PT Plan: Ongoing PT  PT Frequency: 3 times per week  PT Discharge Recommendations: Low intensity level of continued care  PT Recommended Transfer Status: Contact guard  PT - OK to Discharge: Yes      Subjective     PT Visit Info:  PT Received On: 07/18/25  General Visit Information:  General  Reason for Referral: Patient presented to OSH 07/14 for sustained VT s/p unsuccessful ablation and transferred to [facility] for repeat ablation and EP study. now s/p 07/17 VT ablation  Past Medical History Relevant to Rehab: ASHWIN, HLD, gout, AAA, oral cancer, 07/08 near syncopal event-found monomorphic VT with CT showing angiogram showed non-obstructive CAD. patient also reporting TIA  Family/Caregiver  Present: No  Prior to Session Communication: Bedside nurse  Patient Position Received: Up in chair, Alarm off, not on at start of session  General Comment: Patient overall pleasant and cooperative. Bilateral groin sites checked pre and post, stable.  Home Living:  Home Living  Type of Home: House  Lives With: Spouse (Wife)  Home Adaptive Equipment:  (patient reporting he maybe has a cane)  Home Layout: Two level, Bed/bath upstairs, 1/2 bath on main level  Home Access: Stairs to enter with rails  Entrance Stairs-Rails: Right  Entrance Stairs-Number of Steps: 5  Bathroom Shower/Tub: Tub/shower unit  Bathroom Toilet: Standard  Bathroom Equipment: None  Prior Level of Function:  Prior Function Per Pt/Caregiver Report  Level of [place]: Independent with ADLs and functional transfers, Independent with homemaking with ambulation  Receives Help From: Family  ADL Assistance: Independent  Homemaking Assistance: Independent  Ambulatory Assistance: Independent (community amb with no AD)  Vocational: Retired  Leisure: camping  Prior Function Comments: (+) drives, (-) falls  Precautions:  Precautions  Medical Precautions: Cardiac precautions, Fall precautions    Lines/Tubes:   Telemetry   Arterial Line   External Defibrillator       Vital Signs     Vitals Session Pre PT During PT Post PT   Heart Rate 71  72   Resp 13  18   SpO2 94 93 - during ambulation 99   /53  126/59   MAP (mmHg) 72           Objective   Pain:  Pain Assessment  Pain Assessment: 0-10  0-10 (Numeric) Pain Score: 0 - No pain  Cognition:  Cognition  Overall Cognitive Status: Within Functional Limits  Arousal/Alertness: Appropriate responses to stimuli  Orientation Level: Oriented X4  Following Commands: Follows all commands and directions without difficulty    General Assessments:     Activity Tolerance  Endurance: Tolerates 10 - 20 min exercise with multiple rests  Early Mobility/Exercise Safety Screen: Proceed with mobilization - No exclusion criteria  met    Sensation  Light Touch: No apparent deficits    Strength  Strength Comments: B LE Strength >/= 3+/5  Strength  Strength Comments: B LE Strength >/= 3+/5    Static Sitting Balance  Static Sitting-Balance Support: No upper extremity supported, Feet supported  Static Sitting-Level of Assistance: Close supervision  Dynamic Sitting Balance  Dynamic Sitting-Balance Support: No upper extremity supported, Feet supported  Dynamic Sitting-Level of Assistance: Close supervision    Static Standing Balance  Static Standing-Balance Support: No upper extremity supported  Static Standing-Level of Assistance: Contact guard  Dynamic Standing Balance  Dynamic Standing-Balance Support: No upper extremity supported  Dynamic Standing-Level of Assistance: Contact guard  Functional Assessments:  Transfers  Transfer: Yes  Transfer 1  Technique 1: Sit to stand, Stand to sit  Transfer Level of Assistance 1: Contact guard  Trials/Comments 1: x1 trial; B UE support from chair    Ambulation/Gait Training  Ambulation/Gait Training Performed: Yes  Ambulation/Gait Training 1  Surface 1: Level tile  Device 1: No device  Assistance 1: Contact guard  Quality of Gait 1: Wide base of support, Inconsistent stride length (decreased stance time on R leg (patient reporting gout in R LE))  Comments/Distance (ft) 1: ~15ft; no LOB noted, increased trunk sway    Stairs  Stairs: Yes  Stairs  Rails 1: Right  Curb Step 1: Yes  Device 1: Railing  Assistance 1: Contact guard  Comment/Number of Steps 1: x5 steps to step up with no risers; verbal cueing for patient to lead with L leg doing up stairs due to gout in R leg.  Extremity/Trunk Assessments:  RLE   RLE : Within Functional Limits  LLE   LLE : Within Functional Limits  Treatments:  Therapeutic Activity  Therapeutic Activity Performed: Yes  Therapeutic Activity 1: ~2 mins static stand with no AD and CGA; patient not demonstrating any unsteadiness  Therapeutic Activity 2: ~25ft ambulation with CGA and no  AD; patient demonstrating wide ISABELLE and decreased stance time on R leg.  Therapeutic Activity 3: ~5ft ambulation (x2 trials); patient demonstrating wide ISABELLE and decreased stance time on R leg; no unsteadiness noted  Therapeutic Activity 4: x1 sit<>stand with close supervision and no AD; patient uses B UE support from chair  Therapeutic Activity 5: x10 steps to step up with no riser, close supervision, and R handrailing. with verbal cueing, patient leads with L leg up stairs due to gout in R LE    Ambulation/Gait Training  Ambulation/Gait Training Performed: Yes  Ambulation/Gait Training 1  Surface 1: Level tile  Device 1: No device  Assistance 1: Contact guard  Quality of Gait 1: Wide base of support, Inconsistent stride length (decreased stance time on R leg (patient reporting gout in R LE))  Comments/Distance (ft) 1: ~15ft; no LOB noted, increased trunk sway  Transfers  Transfer: Yes  Transfer 1  Technique 1: Sit to stand, Stand to sit  Transfer Level of Assistance 1: Contact guard  Trials/Comments 1: x1 trial; B UE support from chair    Stairs  Stairs: Yes  Stairs  Rails 1: Right  Curb Step 1: Yes  Device 1: Railing  Assistance 1: Contact guard  Comment/Number of Steps 1: x5 steps to step up with no risers; verbal cueing for patient to lead with L leg doing up stairs due to gout in R leg.  Outcome Measures:  [facility] Basic Mobility  Turning from your back to your side while in a flat bed without using bedrails: A little  Moving from lying on your back to sitting on the side of a flat bed without using bedrails: A little  Moving to and from bed to chair (including a wheelchair): A little  Standing up from a chair using your arms (e.g. wheelchair or bedside chair): A little  To walk in hospital room: A little  Climbing 3-5 steps with railing: A little  Basic Mobility - Total Score: 18    Confusion Assessment Method-ICU (CAM-ICU)  Feature 1: Acute Onset or Fluctuating Course: Negative  Overall CAM-ICU:  Negative    FSS-ICU  Ambulation: Walks <50 feet with any assistance x1 or walks any distance with assistance x2 people  Rolling: Supervision or set-up only  Sitting: Supervision or set-up only  Transfer Sit-to-Stand: Minimal assistance (performs 75% or more of task)  Transfer Supine-to-Sit: Supervision or set-up only  Total Score: 20      Early Mobility/Exercise Safety Screen: Proceed with mobilization - No exclusion criteria met  ICU Mobility Scale: Walking with assistance of 1 person [8]  E = Exercise and Early Mobility  Early Mobility/Exercise Safety Screen: Proceed with mobilization - No exclusion criteria met  ICU Mobility Scale: Walking with assistance of 1 person  Tinetti  Sitting Balance: Steady, safe  Arises: Able, uses arms to help  Attempts to Arise: Able to arise, one attempt  Immediate Standing Balance (First 5 Seconds): Steady without walker or other support  Standing Balance: Narrow stance without support  Nudged: Steady without walker or other support  Eyes Closed: Steady  Turned 360 Degrees: Steadiness: Steady  Turned 360 Degrees: Continuity of Steps: Continuous  Sitting Down: Uses arms or not a smooth motion  Balance Score: 14  Initiation of Gait: No hesitancy  Step Height: R Swing Foot: Right foot complete clears floor  Step Length: R Swing Foot: Passes left stance foot  Step Height: L Swing Foot: Left foot complete clears floor  Step Length: L Swing Foot: Passes right stance foot  Step Symmetry: Right and left step length not equal (Estimate)  Step Continuity: Steps appear continuous  Path: Mild/moderate deviation or uses walking aid  Trunk: Marked sway or uses walking aid  Walking Time: Heels apart  Gait Score: 7  Total Score: 21  Tinetti score of 21/28 indicates a moderate risk of falls    Encounter Problems       Encounter Problems (Active)       Balance       Patient will score >24/28 on the Tinetti scale to present as a low risk for falls (Progressing)       Start:  07/18/25    Expected End:   08/01/25            Patient will score >24/30 on the FGA to present as a low risk of falls (Progressing)       Start:  07/18/25    Expected End:  08/01/25               Mobility       Patient will ascend and descend five stairs indep (Progressing)       Start:  07/18/25    Expected End:  08/01/25               Mobility       Patient will ambulate >500ft indep (Progressing)       Start:  07/18/25    Expected End:  08/01/25               PT Transfers       Patient to transfer to and from sit to supine indep (Progressing)       Start:  07/18/25    Expected End:  08/01/25            STG - Patient will transfer sit to and from stand indep (Progressing)       Start:  07/18/25    Expected End:  08/01/25                   Education Documentation  No documentation found.  Education Comments  No comments found.    MANOJ Holguin  Completion of this session, clinical decision making, and documentation performed under the supervision/direction of Nga Vasquez DPT.

## 2025-07-18 NOTE — PROGRESS NOTES
"CARDIAC ICU PROGRESS NOTE    Aditya Rueda Jr./[id number]    Admit Date: 7/17/2025  Hospital Length of Stay: 1   ICU Length of Stay: 1d 3h     Aditya Rueda is a 70 y.o. male with PMH of ASHWIN, HLD, gout, AAA, oral cancer who was transferred to [facility] for repeat VT ablation. Patient priorly admitted to [facility] for sustained VT s/p unsuccessful ablation.    Subjective            Objective    VITALS:   Visit Vitals  /65   Pulse 70   Temp 36.4 °C (97.5 °F) (Temporal)   Resp 21   Ht 1.778 m (5' 10\")   Wt 96.8 kg (213 lb 6.5 oz)   SpO2 95%   BMI 30.62 kg/m²   Smoking Status Former   BSA 2.19 m²         Infusions:       INTAKE/OUTPUT:  I/O last 3 completed shifts:  In: 1014.2 (10.5 mL/kg) [P.O.:480; I.V.:134.2 (1.4 mL/kg); IV Piggyback:400]  Out: 2025 (20.9 mL/kg) [Urine:2010 (0.6 mL/kg/hr); Blood:15]  Weight: 96.8 kg      Wt Readings from Last 5 Encounters:   07/18/25 96.8 kg (213 lb 6.5 oz)   07/16/25 99.3 kg (219 lb)       LABS:  CBC:  Recent Labs     07/18/25 0429 07/17/25  0512   WBC 11.0 8.2   HGB 14.6 15.1   HCT 43.5 48.0    158   MCV 85 89     CMP:  Recent Labs     07/18/25  0429 07/17/25  0512    137   K 3.9 4.0    103   CO2 25 24   ANIONGAP 15 14   BUN 19 12   CREATININE 0.95 0.97   EGFR 86 84   MG 2.05 2.21     Recent Labs     07/18/25  0429 07/17/25  0512   ALBUMIN 3.4 3.8   ALT  --  12   AST  --  16   BILITOT  --  0.8     COAG:   Recent Labs     07/17/25  0636   INR 1.2*     ABO:   Recent Labs     07/17/25  0512   ABO B     HEME/ENDO:  Recent Labs     07/17/25  0512 03/16/25  1132 02/25/22  1232   TSH 3.63  --   --    HGBA1C  --  5 5.4      CARDIAC: No results for input(s): \"LDH\", \"CKMB\", \"TROPHS\", \"BNP\" in the last 03667 hours.    No lab exists for component: \"CK\", \"CKMBP\"  No results for input(s): \"LACMX\", \"LACTATEART\", \"SO2MV\", \"O2CMX\" in the last 76456 hours.    No lab exists for component: \"S\"  No results for input(s): \"TACROLIMUS\", \"SIROLIMUS\", \"CYCLOSPORINE\" in the last 61892 " "hours.    Results from last 7 days   Lab Units 07/18/25  0429 07/17/25  0512   WBC AUTO x10*3/uL 11.0 8.2   HEMOGLOBIN g/dL 14.6 15.1   HEMATOCRIT % 43.5 48.0   PLATELETS AUTO x10*3/uL 162 158     Results from last 7 days   Lab Units 07/18/25  0429 07/17/25  0512   SODIUM mmol/L 140 137   POTASSIUM mmol/L 3.9 4.0   CO2 mmol/L 25 24   ANION GAP mmol/L 15 14   BUN mg/dL 19 12   CREATININE mg/dL 0.95 0.97   GLUCOSE mg/dL 126* 89   EGFR mL/min/1.73m*2 86 84   MAGNESIUM mg/dL 2.05 2.21   PHOSPHORUS mg/dL 2.8  --       Results from last 7 days   Lab Units 07/17/25  0512   ALT U/L 12   AST U/L 16   ALK PHOS U/L 76      Results from last 7 days   Lab Units 07/17/25  0636   INR  1.2*             Results from last 7 days   Lab Units 07/17/25  0512   FREE T4 ng/dL 0.82               No results found for: \"CKTOTAL\", \"CKMB\", \"CKMBINDEX\", \"TROPONINI\"   Lab Results   Component Value Date    HGBA1C 5 03/16/2025      No results found for: \"CHOL\"  No results found for: \"HDL\"  No results found for: \"LDLCALC\"  No results found for: \"TRIG\"  No components found for: \"CHOLHDL\"     IMAGING:   Imaging  No results found.    Cardiology, Vascular, and Other Imaging  Electrocardiogram, 12-lead PRN ACS symptoms  Result Date: 7/17/2025  Normal sinus rhythm Left axis deviation Septal infarct , age undetermined Abnormal ECG No previous ECGs available Confirmed by Zafar Quiles ([id number]) on 7/17/2025 12:09:14 PM         PHYSICAL EXAM:  Constitutional: Well-developed male in no acute distress.  HEENT: Normocephalic, atraumatic. PERRL. EOMI. No cervical lymphadenopathy.  Respiratory: CTA bilaterally. No wheezes, rales, or rhonchi. Normal respiratory effort.  Cardiovascular: RRR. No murmurs, gallops, or rubs. No JVD. Radial pulses 2+.  Abdominal: Soft, nondistended, nontender to palpation. Bowel sounds present. No hepatosplenomegaly or masses. No CVA tenderness.  Neuro: CN II-XII intact. UE and LE strength 5/5 bilaterally and sensation intact. Normal FTN " testing.  MSK: No LE edema bilaterally. Right femoral access site clean w/o evidence of hematoma, Swelling on R-Knee, R-Hallux  Skin: Warm, dry. No rashes or wounds.  Psych: Appropriate mood and affect.    MEDICATIONS:   Scheduled medications  Scheduled Medications[1]    Continuous medications  Continuous Medications[2]    PRN medications  PRN Medications[3]          Assessment/Plan   Aditya Rueda is a 70 y.o. male with PMH of ASHWIN, HLD, gout, AAA, oral cancer who was transferred to [facility] for repeat VT ablation. Patient priorly admitted to [facility] for sustained VT s/p unsuccessful ablation, transferred to [facility] for EP study + VT ablation.     Summary for 07/18/25:  -1 episode of Sustained VT this morning, asymptomatic ,VSS  -Plan for Ablation this afternoon  -Repeat TSH, T4 given elevated 5.75 from [facility]  -7/10 Cardiac MRI Images requested from [facility] STAT    Neuro:  JACOBO    Cardio:  #Sustained monomorphic ventricular tachycardia  :: TSH 5.75 (7/13)  -History of TIA and multiple syncopal events leading to MCOT which discovered nonsustained ventricular tachycardia leading to ER presentation and admission on 7/9/2025. This workup included a coronary CTA which showed mild nonobstructive disease and a cardiac MRI that showed subendocardial late gadolinium enhancement in the basilar septum.    -VT originally suspected to be scar-mediated coming from the basal septum where he was noted to have LGE on his CMR.  - S/p VT ab;ation at OSH w/VT reccurence   Plan:  -tele   -Ablation today w/ EP at Noon  -  metop 25 BID  - will not start suppressive medications 2/2  upcoming EP study  - Repeat TSH / T4     #Mild nonobstructive coronary artery disease  #HLD  :: 7/13/25 LHC: LVEDP 14mmHg, no evidence of aortic stenosis, mild non-obstructing CAD  :: Lipid panel with an LDL of 50; nondiabetic A1c of 5.4 (3 months ago); non-smoker, no history of hypertension  - asa 81 mg daily, atorvastatin 40 mg daily, metoprolol tartrate 25 mg  twice daily    #Mildly Dilated Abdominal Aortic Aneurysm   -CTA Abdomen pelvis in 3/2025 with 4.0 cm aneurysm  - OP FU    Pulmonary:  JACOBO    Renal:  JACOBO    Gastrointestinal:  JACOBO    Heme/Onc:  JACOBO    Endocrine:  JACOBO    MSK:  Hx Gout  ::States prior hx of gout treated with colchicine PRN  :: Charted allergy to Allopurinol  :: endorsing symtpoms in R knee and great toe  - cw colchicine 1.2mg every day follow symptom/adverse effect response    F: as needed  E: K>4 Mg>2  N: npo  A: PIV  DVT Ppx: none  Code status: full code    Code Status: Full Code (confirmed on admission)   NOK: Extended Emergency Contact Information  Primary Emergency Contact: RODRIGUEZKATHERINE  Address: [address]  Home Phone: [phone]  Relation: Spouse  Preferred language: English   needed? No        Staffed with attending physician during rounds    Trevon Miller MD PhD  PGY-2 Internal Medicine            [1] apixaban, 5 mg, oral, BID  aspirin, 81 mg, oral, Daily  atorvastatin, 40 mg, oral, Nightly  colchicine, 1.2 mg, oral, Daily  docusate sodium, 100 mg, oral, BID  ethyl alcohol, 25 mL, intracardiac, Once  lidocaine-epinephrine, 10 mL, injection, Once  metoprolol tartrate, 25 mg, oral, BID  mexiletine, 200 mg, oral, q12h FRANCISCO J  polyethylene glycol, 17 g, oral, Daily  potassium chloride CR, 20 mEq, oral, Once     [2]    [3]

## 2025-07-18 NOTE — PROGRESS NOTES
"Subjective Data:  Pt did well overnight, no pain  Rare PVCs on telemetry, no NSVT      Objective Data:  Last Recorded Vitals:  Vitals:    07/18/25 0500 07/18/25 0600 07/18/25 0700 07/18/25 0800   BP:       BP Location:       Patient Position:       Pulse: 64 64 70 70   Resp: 16 14 21 14   Temp:    36.1 °C (97 °F)   TempSrc:    Temporal   SpO2: 96% 97% 95% 96%   Weight:       Height:           Last Labs:  CBC - 7/18/2025:  4:29 AM  11.0 14.6 162    43.5      CMP - 7/18/2025:  4:29 AM  8.8 6.4 16 --- 0.8   2.8 3.4 12 76      PTT - 7/17/2025:  6:36 AM  1.2   13.0 31     HGBA1C   Date/Time Value Ref Range Status   03/16/2025 11:32 AM 5 4.3 - 5.6 % Final     Comment:     American Diabetes Association guidelines indicate that patients with HgbA1c in the range 5.7-6.4% are at increased risk for development of diabetes, and intervention by lifestyle modification may be beneficial. HgbA1c greater or equal to 6.5% is considered diagnostic of diabetes.   02/25/2022 12:32 PM 5.4 % Final     Comment:     Normal less than 5.7%  Prediabetes 5.7% to 6.4%  Diabetes 6.5% or higher      --HgbA1C levels may not be accurate in patients who have  renal disease, received recent blood transfusions, are anemic,  or who have dyshemoglobinemia.      Last I/O:  I/O last 3 completed shifts:  In: 1014.2 (10.5 mL/kg) [P.O.:480; I.V.:134.2 (1.4 mL/kg); IV Piggyback:400]  Out: 2025 (20.9 mL/kg) [Urine:2010 (0.6 mL/kg/hr); Blood:15]  Weight: 96.8 kg     Past Cardiology Tests (Last 3 Years):  EKG:  Electrocardiogram, 12-lead PRN ACS symptoms 07/17/2025    Echo:  No results found for this or any previous visit from the past 1095 days.    Ejection Fractions:  No results found for: \"EF\"  Cath:  No results found for this or any previous visit from the past 1095 days.    Stress Test:  No results found for this or any previous visit from the past 1095 days.    Cardiac Imaging:  MR cardiac morphology and function w and wo IV contrast 07/10/2025      CT " angio coronary art with heartflow if score >30% 07/10/2025      Inpatient Medications:  Scheduled Medications[1]  PRN Medications[2]  Continuous Medications[3]    Physical Exam:  GEN: NAD  HEENT: ATNC,  anicteric, no JVD  CV: RRR  Pulm: CTAB  Abdomen: NTND  Ext: warm, bilateral groin access sites with no hematoma  Neuro: A+Ox3  Psych: appropriate       Assessment/Plan     Patient is a 69 yo M with hx of ASHWIN, alcohol use, oral cancer, who presents for outflow tract VT/PVCs ablation.  He initially had an ablation at [facility] on 7/16 and had a repeat ablation here with Dr Vargas on 7/17. Pt with non inducible clinical VT, but still with some residual clinical PVCs that are rare.    Recommendations  -eliquis 5 BID x 30 days post ablation  -continue mexiletine 200 BID   -beta blocker as tolerated   -discharge with wearable defibrillator  -follow up with Dr Koby Bonilla at [facility]    Patient was staffed with Dr. Vargas    EP Consult Pager: [phone] (weekday 7AM-6PM and weekend 7AM-2PM) and other: [phone]        Peripheral IV 07/17/25 20 G Anterior;Left Forearm (Active)   Site Assessment Clean;Dry;Intact 07/18/25 0400   Dressing Type Transparent 07/18/25 0400   Line Status Flushed 07/18/25 0400   Dressing Status Clean;Dry;Occlusive 07/18/25 0400   Number of days: 1       Peripheral IV 07/10/25 Anterior;Right Forearm (Active)   Site Assessment Clean;Dry;Intact 07/18/25 0400   Dressing Type Transparent 07/18/25 0400   Line Status Flushed 07/18/25 0400   Dressing Status Clean;Dry;Occlusive 07/18/25 0400   Number of days: 8       Arterial Line 07/17/25 Left Radial (Active)   Present on Admission to Healthcare Facility No 07/18/25 0800   Site Assessment Clean;Dry;Intact;No hematoma;Soft 07/18/25 0800   Line Status Pulsatile blood flow 07/18/25 0800   Art Line Waveform Appropriate 07/18/25 0800   Art Line Interventions Zeroed and calibrated;Leveled;Connections checked and tightened;Flushed per protocol 07/18/25 0800    Color/Movement/Sensation Capillary refill less than 3 sec;Distal pulses palpable 07/18/25 0800   Dressing Type Antimicrobial patch;Transparent 07/18/25 0800   Dressing Status Clean;Dry;Occlusive 07/18/25 0800   Dressing Intervention Dressing reinforced 07/18/25 0800   Dressing Change Due 07/24/25 07/18/25 0800   Number of days: 1       Code Status:  Full Code    I spent 30 minutes in the professional and overall care of this patient.        Jose Rain MD       [1]   Scheduled medications   Medication Dose Route Frequency    apixaban  5 mg oral BID    aspirin  81 mg oral Daily    atorvastatin  40 mg oral Nightly    colchicine  1.2 mg oral Daily    docusate sodium  100 mg oral BID    ethyl alcohol  25 mL intracardiac Once    lidocaine-epinephrine  10 mL injection Once    metoprolol tartrate  25 mg oral BID    mexiletine  200 mg oral q12h FRANCISCO J    polyethylene glycol  17 g oral Daily   [2]   PRN medications   Medication   [3]   Continuous Medications   Medication Dose Last Rate

## 2025-07-18 NOTE — HOSPITAL COURSE
Aditya Rueda is a 70 y.o. male with PMH of ASHWIN, HLD, gout, AAA, oral cancer who was transferred to [facility] for repeat VT ablation. Patient priorly admitted to [facility] for sustained VT s/p unsuccessful ablation, transferred to [facility] for EP study + VT ablation. Patient underwent ablation here at [facility] with Dr. Vargas on 7/17. Had some residual clinical PVCs but symptoms of lightheadedness and palpitations improved after ablation. Patient was started on mexiletine 200 mg BID as well as eliquis 5 mg BID for 30 days. A life vest order was placed.     He was transferred to the floor on 7/18 and remained hemodynamically stable. Patient seen by PT who recommended discharge home with low intensity care. Upon discharge he was feeling improved with no complaints, plan for continuation of mexilitene 200 mg BID and eliquis 5 mg BID for 30 days with EP follow up outpatient.

## 2025-07-18 NOTE — PROGRESS NOTES
Occupational Therapy    Evaluation and Treatment    Patient Name: Aditya Rueda Jr.  MRN: [id number]  Today's Date: 7/18/2025  Room: 11/11  Time Calculation  Start Time: 0849  Stop Time: 0915  Time Calculation (min): 26 min    Assessment  IP OT Assessment  OT Assessment: Overall decreased strength and endurance limiting occupational performance.  Prognosis: Good  Barriers to Discharge Home: No anticipated barriers  Evaluation/Treatment Tolerance: Patient tolerated treatment well  Medical Staff Made Aware: Yes  End of Session Communication: Bedside nurse  End of Session Patient Position: Up in chair, Alarm off, not on at start of session  Plan:  Inpatient Plan  Treatment Interventions: ADL retraining, Functional transfer training, Endurance training, Neuromuscular reeducation, Compensatory technique education  OT Frequency: 2 times per week  OT Discharge Recommendations: Low intensity level of continued care  Equipment Recommended upon Discharge:  (tbd)  OT Recommended Transfer Status:  (cga)  OT - OK to Discharge: Yes  OT Assessment  OT Assessment Results: Decreased ADL status, Decreased endurance, Decreased functional mobility  Prognosis: Good  Evaluation/Treatment Tolerance: Patient tolerated treatment well  Medical Staff Made Aware: Yes    Subjective   Current Problem:  1. VT (ventricular tachycardia) (Multi)  Case Request EP Lab: Ablation VT    Case Request EP Lab: Ablation VT    Electrophysiology procedure    Electrophysiology procedure        General:  Reason for Referral: 71 y/o male admitted for VT. Now s/p ablation.  Past Medical History Relevant to Rehab: ASHWIN, HLD, gout, AAA, oral cancer  Prior to Session Communication: Bedside nurse  Patient Position Received: Bed, 3 rail up, Alarm off, not on at start of session  Family/Caregiver Present: No  General Comment: Pt is pleasant and cooperative. He put forth good effort towards task completion. Groin sites stable.   Precautions:  Hearing/Visual Limitations: B  hearing aides  Medical Precautions: Cardiac precautions, Fall precautions  Vital Signs:   Date/Time Vitals Session Patient Position Pulse Resp SpO2 BP MAP (mmHg)    07/18/25 0800 --  --  70  14  96 %  --  --     07/18/25 0850 Pre OT  Lying  71  17  96 %  122/57  --     07/18/25 0851 Post OT  Sitting  64  16  96 %  130/60  83     07/18/25 0900 --  --  66  14  96 %  --  --    Vitals stable throughout    Pain:  Pain Assessment  Pain Assessment: 0-10  0-10 (Numeric) Pain Score: 0 - No pain  Lines/Tubes/Drains:  Arterial Line 07/17/25 Left Radial (Active)   Number of days: 0         Objective   Cognition:  Overall Cognitive Status: Within Functional Limits  Arousal/Alertness: Appropriate responses to stimuli  Orientation Level: Oriented X4  Following Commands: Follows one step commands consistently     Confusion Assessment Method (CAM)  Acute Onset and Fluctuating Course (1A): No  Mcallister Agitation Sedation Scale  Mcallister Agitation Sedation Scale (RASS): Alert and calm  Home Living:  Type of Home: House  Lives With: Spouse  Home Adaptive Equipment: Cane  Home Layout: Two level, Bed/bath upstairs, 1/2 bath on main level  Home Access: Stairs to enter with rails  Entrance Stairs-Number of Steps: 3  Bathroom Shower/Tub: Walk-in shower  Bathroom Equipment: Shower chair with back   Prior Function:  Level of [place]: Independent with ADLs and functional transfers, Independent with homemaking with ambulation  Receives Help From: Family  ADL Assistance: Independent  Homemaking Assistance: Independent  Ambulatory Assistance: Independent  Vocational: Retired  Leisure: Yard work, fishing. camping  Prior Function Comments: (+)drives, denies falls  IADL History:     ADL:  Grooming Assistance: Stand by  Bathing Assistance: Minimal  UE Dressing Assistance: Stand by  LE Dressing Assistance: Minimal  LE Dressing Deficit: Don/doff R sock, Don/doff L sock  Toileting Assistance with Device: Stand by  Activity Tolerance:  Endurance:  Decreased tolerance for upright activites  Early Mobility/Exercise Safety Screen: Proceed with mobilization - No exclusion criteria met  Balance:  Static Sitting Balance  Static Sitting-Level of Assistance: Distant supervision  Static Standing Balance  Static Standing-Level of Assistance: Close supervision  Bed Mobility/Transfers: Bed Mobility/Transfers: Bed Mobility  Bed Mobility: Yes  Bed Mobility 1  Bed Mobility 1: Supine to sitting  Level of Assistance 1: Close supervision   and Transfers  Transfer: Yes  Transfer 1  Transfer From 1: Sit to  Transfer to 1: Stand, Bed  Technique 1: Sit to stand, Stand to sit  Transfer Level of Assistance 1: Contact guard  Transfers 2  Transfer From 2: Sit to  Transfer to 2: Stand, Toilet  Technique 2: Sit to stand, Stand to sit  Transfer Level of Assistance 2: Minimum assistance (Use of grab bar)  IADL's:      Vision:     and Vision - Complex Assessment  Ocular Range of Motion: Within Functional Limits  Sensation:  Light Touch: No apparent deficits  Strength:  Strength Comments: BUE strength WFL  Perception:  Inattention/Neglect: Appears intact  Coordination:  Movements are Fluid and Coordinated: Yes   Hand Function:  Hand Function  Gross Grasp: Functional  Coordination: Functional        Outcome Measures: [facility] Daily Activity  Putting on and taking off regular lower body clothing: A little  Bathing (including washing, rinsing, drying): A little  Putting on and taking off regular upper body clothing: None  Toileting, which includes using toilet, bedpan or urinal: A little  Taking care of personal grooming such as brushing teeth: None  Eating Meals: None  Daily Activity - Total Score: 21    Confusion Assessment Method-ICU (CAM-ICU)  Feature 1: Acute Onset or Fluctuating Course: Negative  Feature 3: Altered Level of Consciousness: Negative  Overall CAM-ICU: Negative   ICU Mobility Screen  Early Mobility/Exercise Safety Screen: Proceed with mobilization - No exclusion criteria met,    Mcallister Agitation Sedation Scale  Mcallister Agitation Sedation Scale (RASS): Alert and calm      Education Documentation  No documentation found.  Education Comments  No comments found.        Goals:   Encounter Problems       Encounter Problems (Active)       ADLs       Patient will complete lower body dressing with MOD I  for donning and doffing all LE clothes in order to increase Indep with task participation.        Start:  07/18/25    Expected End:  08/01/25            Patient will complete toileting, including clothing management and hygiene, with MOD I in order to maximize functional Indep with task completion.        Start:  07/18/25    Expected End:  08/01/25               COGNITION/SAFETY       Pt will identify and incorporate 3 EC/WS strategies into daily routines for increased safety and Indep.        Start:  07/18/25    Expected End:  08/01/25               EXERCISE/STRENGTHENING       Pt will increase endurance to tolerate 15-20min of activity with no more than 1 rest break in order to increase ability to engage in ADL completion.        Start:  07/18/25    Expected End:  08/01/25               MOBILITY       Pt will demo increased functional mobility to tolerate tasks necessary to complete ADL routine with MOD I.       Start:  07/18/25    Expected End:  08/01/25                   Treatment Completed on Evaluation       Activities of Daily Living:                LE Dressing  LE Dressing: Yes  LE Dressing Comments: MIN A to fully don socks while seated EOB. Difficulty with initially threading sock onto foot.  Toileting  Toileting Comments: SBA for clothing management and maegan care after BM a ttoilet.            Therapy/Activity:     Therapeutic Activity  Therapeutic Activity Performed: Yes  Therapeutic Activity 1: Pt ambulated bed->toilet->chair. Completed with CGA HHA. Mild unsteadiness at times. Reports persistent mild light headed during session. Vitals stable throughout.             07/18/25 at 9:35  LEANNE Humphrey, OT   Rehab Office: [phone]

## 2025-07-19 ENCOUNTER — PHARMACY VISIT (OUTPATIENT)
Dept: PHARMACY | Facility: CLINIC | Age: 70
End: 2025-07-19
Payer: COMMERCIAL

## 2025-07-19 VITALS
HEART RATE: 56 BPM | HEIGHT: 70 IN | OXYGEN SATURATION: 96 % | SYSTOLIC BLOOD PRESSURE: 104 MMHG | WEIGHT: 217.37 LBS | BODY MASS INDEX: 31.12 KG/M2 | RESPIRATION RATE: 18 BRPM | DIASTOLIC BLOOD PRESSURE: 62 MMHG | TEMPERATURE: 97.7 F

## 2025-07-19 PROBLEM — G45.9 TIA (TRANSIENT ISCHEMIC ATTACK): Status: RESOLVED | Noted: 2025-07-17 | Resolved: 2025-07-19

## 2025-07-19 PROBLEM — I63.9 CVA (CEREBRAL VASCULAR ACCIDENT) (MULTI): Status: RESOLVED | Noted: 2025-07-17 | Resolved: 2025-07-19

## 2025-07-19 LAB
ANION GAP SERPL CALC-SCNC: 13 MMOL/L (ref 10–20)
BASOPHILS # BLD AUTO: 0.04 X10*3/UL (ref 0–0.1)
BASOPHILS NFR BLD AUTO: 0.5 %
BUN SERPL-MCNC: 17 MG/DL (ref 6–23)
CALCIUM SERPL-MCNC: 8.8 MG/DL (ref 8.6–10.6)
CHLORIDE SERPL-SCNC: 106 MMOL/L (ref 98–107)
CO2 SERPL-SCNC: 23 MMOL/L (ref 21–32)
CREAT SERPL-MCNC: 0.97 MG/DL (ref 0.5–1.3)
EGFRCR SERPLBLD CKD-EPI 2021: 84 ML/MIN/1.73M*2
EOSINOPHIL # BLD AUTO: 0.19 X10*3/UL (ref 0–0.7)
EOSINOPHIL NFR BLD AUTO: 2.5 %
ERYTHROCYTE [DISTWIDTH] IN BLOOD BY AUTOMATED COUNT: 13.3 % (ref 11.5–14.5)
GLUCOSE BLD MANUAL STRIP-MCNC: 127 MG/DL (ref 74–99)
GLUCOSE BLD MANUAL STRIP-MCNC: 146 MG/DL (ref 74–99)
GLUCOSE SERPL-MCNC: 90 MG/DL (ref 74–99)
HCT VFR BLD AUTO: 48 % (ref 41–52)
HGB BLD-MCNC: 15 G/DL (ref 13.5–17.5)
IMM GRANULOCYTES # BLD AUTO: 0.03 X10*3/UL (ref 0–0.7)
IMM GRANULOCYTES NFR BLD AUTO: 0.4 % (ref 0–0.9)
LYMPHOCYTES # BLD AUTO: 1.17 X10*3/UL (ref 1.2–4.8)
LYMPHOCYTES NFR BLD AUTO: 15.6 %
MAGNESIUM SERPL-MCNC: 2.23 MG/DL (ref 1.6–2.4)
MCH RBC QN AUTO: 28.6 PG (ref 26–34)
MCHC RBC AUTO-ENTMCNC: 31.3 G/DL (ref 32–36)
MCV RBC AUTO: 92 FL (ref 80–100)
MONOCYTES # BLD AUTO: 0.69 X10*3/UL (ref 0.1–1)
MONOCYTES NFR BLD AUTO: 9.2 %
NEUTROPHILS # BLD AUTO: 5.38 X10*3/UL (ref 1.2–7.7)
NEUTROPHILS NFR BLD AUTO: 71.8 %
NRBC BLD-RTO: 0 /100 WBCS (ref 0–0)
PLATELET # BLD AUTO: 159 X10*3/UL (ref 150–450)
POTASSIUM SERPL-SCNC: 3.9 MMOL/L (ref 3.5–5.3)
RBC # BLD AUTO: 5.24 X10*6/UL (ref 4.5–5.9)
SODIUM SERPL-SCNC: 138 MMOL/L (ref 136–145)
T4 FREE SERPL-MCNC: 1.03 NG/DL (ref 0.78–1.48)
TSH SERPL-ACNC: 4.63 MIU/L (ref 0.44–3.98)
WBC # BLD AUTO: 7.5 X10*3/UL (ref 4.4–11.3)

## 2025-07-19 PROCEDURE — 82947 ASSAY GLUCOSE BLOOD QUANT: CPT

## 2025-07-19 PROCEDURE — 36415 COLL VENOUS BLD VENIPUNCTURE: CPT

## 2025-07-19 PROCEDURE — 2500000001 HC RX 250 WO HCPCS SELF ADMINISTERED DRUGS (ALT 637 FOR MEDICARE OP)

## 2025-07-19 PROCEDURE — 84443 ASSAY THYROID STIM HORMONE: CPT

## 2025-07-19 PROCEDURE — 99233 SBSQ HOSP IP/OBS HIGH 50: CPT

## 2025-07-19 PROCEDURE — 80048 BASIC METABOLIC PNL TOTAL CA: CPT

## 2025-07-19 PROCEDURE — 84439 ASSAY OF FREE THYROXINE: CPT

## 2025-07-19 PROCEDURE — RXMED WILLOW AMBULATORY MEDICATION CHARGE

## 2025-07-19 PROCEDURE — 83735 ASSAY OF MAGNESIUM: CPT

## 2025-07-19 PROCEDURE — 85025 COMPLETE CBC W/AUTO DIFF WBC: CPT

## 2025-07-19 RX ORDER — MEXILETINE HYDROCHLORIDE 200 MG/1
200 CAPSULE ORAL EVERY 12 HOURS SCHEDULED
Qty: 60 CAPSULE | Refills: 0 | Status: SHIPPED | OUTPATIENT
Start: 2025-07-19

## 2025-07-19 RX ORDER — COLCHICINE 0.6 MG/1
1.2 TABLET ORAL DAILY
Qty: 60 TABLET | Refills: 0 | Status: SHIPPED | OUTPATIENT
Start: 2025-07-20 | End: 2025-08-19

## 2025-07-19 RX ADMIN — APIXABAN 5 MG: 5 TABLET, FILM COATED ORAL at 08:16

## 2025-07-19 RX ADMIN — ASPIRIN 81 MG: 81 TABLET, CHEWABLE ORAL at 08:16

## 2025-07-19 RX ADMIN — COLCHICINE 1.2 MG: 0.6 TABLET, FILM COATED ORAL at 08:16

## 2025-07-19 RX ADMIN — MEXILETINE HYDROCHLORIDE 200 MG: 200 CAPSULE ORAL at 08:16

## 2025-07-19 RX ADMIN — METOPROLOL TARTRATE 25 MG: 25 TABLET, FILM COATED ORAL at 08:16

## 2025-07-19 ASSESSMENT — PAIN SCALES - GENERAL: PAINLEVEL_OUTOF10: 0 - NO PAIN

## 2025-07-19 NOTE — DISCHARGE SUMMARY
Discharge Diagnosis  VT (ventricular tachycardia) (Multi)           Issues Requiring Follow-Up  1) Please continue mexilitene 200 mg BID and follow up with our EP team outpatient. Plan for follow up with Dr. Bonilla 1 month after ablation (to note: patient pays $58/month for mexilitene. On outpatient appointment can consider trialing another more affordable therapy).  2) Continue eliquis 5 mg BID for 30 days       Discharge Meds     Medication List      START taking these medications     apixaban 5 mg tablet; Commonly known as: Eliquis; Take 1 tablet (5 mg)   by mouth 2 times a day.   colchicine 0.6 mg tablet; Take 2 tablets (1.2 mg) by mouth once daily.;   Start taking on: July 20, 2025   mexiletine 200 mg capsule; Commonly known as: Mexitil; Take 1 capsule   (200 mg) by mouth every 12 hours.     CONTINUE taking these medications     ammonium lactate 12 % lotion; Commonly known as: Lac-Hydrin   aspirin 81 mg chewable tablet   atorvastatin 40 mg tablet; Commonly known as: Lipitor   melatonin 5 mg tablet   metoprolol tartrate 25 mg tablet; Commonly known as: Lopressor       Test Results Pending At Discharge  Pending Labs       No current pending labs.            Hospital Course  Aditya Rueda is a 70 y.o. male with PMH of ASHWIN, HLD, gout, AAA, oral cancer who was transferred to [facility] for repeat VT ablation. Patient priorly admitted to [facility] for sustained VT s/p unsuccessful ablation, transferred to [facility] for EP study + VT ablation. Patient underwent ablation here at [facility] with Dr. Vargas on 7/17. Had some residual clinical PVCs but symptoms of lightheadedness and palpitations improved after ablation. Patient was started on mexiletine 200 mg BID as well as eliquis 5 mg BID for 30 days. A life vest order was placed.     He was transferred to the floor on 7/18 and remained hemodynamically stable. Patient seen by PT who recommended discharge home with low intensity care. Upon discharge he was feeling improved with no  complaints, plan for continuation of mexilitene 200 mg BID and eliquis 5 mg BID for 30 days with EP follow up outpatient.      Pertinent Physical Exam At Time of Discharge  Physical Exam  Constitutional: Well-developed male in no acute distress.  HEENT: NCAT, EOMI, PERRL, sclera anicteric, MMM  Respiratory: CTAB. No wheezes, crackles, or rhonchi. Normal respiratory effort on RA.  Cardiovascular: RRR, normal S1/S2, No murmurs, rubs, or gallops.   Abdominal: Soft, nondistended, nontender to palpation. No rebound, masses, or guarding  Neuro: CN II-XII grossly intact. Moving all extremities with no focal deficits  MSK: WWP, no peripheral edema, cap refill<3 seconds  Skin: No lesions, wounds, or ecchymoses   Psych: Appropriate mood and affect, alert and oriented, linear thought process and judgement intact  Outpatient Follow-Up  No future appointments.      Soco Jeronimo MD

## 2025-07-19 NOTE — CARE PLAN
The patient's goals for the shift include      The clinical goals for the shift include Pt will sleepa  minimum of 4 hours by the end of this shift      Problem: Pain - Adult  Goal: Verbalizes/displays adequate comfort level or baseline comfort level  Outcome: Progressing     Problem: Safety - Adult  Goal: Free from fall injury  Outcome: Progressing

## 2025-07-19 NOTE — CARE PLAN
The patient's goals for the shift include      The clinical goals for the shift include Pt will sleepa  minimum of 4 hours by the end of this shift

## 2025-08-19 ENCOUNTER — TELEPHONE (OUTPATIENT)
Dept: CARDIOLOGY | Facility: CLINIC | Age: 70
End: 2025-08-19
Payer: MEDICARE

## (undated) DEVICE — CATHETER, THERMOCOOL SMART TOUCH, SF, D-F CURVE

## (undated) DEVICE — Device

## (undated) DEVICE — ACCESS KIT, S-MAK MINI, 4FR 10CM 0.018IN 40CM, NT/PT, ECHO ENHANCE NEEDLE

## (undated) DEVICE — GUIDEWIRE, HI-TORQUE, VERSACORE, 260CM, FLOPPY

## (undated) DEVICE — CABLE, OCTARAY, SPLIT HANDLE EXT CABLE, 10FT LG

## (undated) DEVICE — CABLE, CATH CONNECTING, SUPREME HEXAPOR, BLACK, 150CM

## (undated) DEVICE — INTRODUCER, HEMOSTASIS, STR/J .038 IN, 8.5FR 12CM

## (undated) DEVICE — PATCHES, EXTERNAL REFERENCE, CARTO3

## (undated) DEVICE — TUBING SET, IRRIGATION, SMARTABLATE

## (undated) DEVICE — CATHETER, OPTRELL, 36 ELECTRODES, D-F CURVE, SM LOOP

## (undated) DEVICE — CATHETER, ELECTROPHYSIOLOGY, SUPREME, 5FR, CRD-2 (5-5-5)

## (undated) DEVICE — INTRODUCER, AGILIS, MEDIUM CURL, 8.5FR X 71CM, DUAL

## (undated) DEVICE — CATHETHER, CS, BI-DIRECTIONAL, 10 POLES, D-F TYPE

## (undated) DEVICE — CATHETER, DIAGNOSTIC, SOUNDSTAR ECO SMS, 8FR

## (undated) DEVICE — CABLE, CONNECTOR, 10FT

## (undated) DEVICE — NEEDLE, NRG TRANSSEPTAL, 98CM, CURVE C0

## (undated) DEVICE — CABLE, 34 HYP, 34 LEMO, 10FT, SMART TOUCH (REPROCESS)

## (undated) DEVICE — INTERFACE CABLE, EXISITING DX CATHETERS, BLUE PORT (REPROCESS)

## (undated) DEVICE — GUIDEWIRE, STRAIGHT, HI-TORQUE BALANCE MIDDLEWEIGHT, 0.014 IN X 300 CM, HYDROCOAT

## (undated) DEVICE — DEVICE, CLOSURE, PERCLOSE, PROSTYLE

## (undated) DEVICE — INTRODUCER SET, HEMOSTASIS, FASTCATH, 6 FR X 12 CM, SP 038GW-G4

## (undated) DEVICE — ELECTRODE, QUICK-COMBO, REDI PACK

## (undated) DEVICE — SHEATH, PINNACLE, 10 CM,  5FR INTRODUCER, 5FR DIA, 2.5 CM DIALATOR